# Patient Record
Sex: FEMALE | Race: WHITE | NOT HISPANIC OR LATINO | Employment: OTHER
[De-identification: names, ages, dates, MRNs, and addresses within clinical notes are randomized per-mention and may not be internally consistent; named-entity substitution may affect disease eponyms.]

---

## 2017-01-03 PROBLEM — I70.0 ABDOMINAL AORTIC ATHEROSCLEROSIS (HCC): Status: ACTIVE | Noted: 2017-01-03

## 2017-01-03 PROBLEM — I70.0 ABDOMINAL AORTIC ATHEROSCLEROSIS: Status: ACTIVE | Noted: 2017-01-03

## 2017-01-03 PROBLEM — J47.9 BRONCHIECTASIS WITHOUT COMPLICATION (HCC): Status: ACTIVE | Noted: 2017-01-03

## 2017-01-03 PROBLEM — E03.9 HYPOTHYROIDISM, UNSPECIFIED TYPE: Status: ACTIVE | Noted: 2017-01-03

## 2017-01-03 PROCEDURE — 87088 URINE BACTERIA CULTURE: CPT | Performed by: INTERNAL MEDICINE

## 2017-01-03 PROCEDURE — 87186 SC STD MICRODIL/AGAR DIL: CPT | Performed by: INTERNAL MEDICINE

## 2017-01-03 PROCEDURE — 87086 URINE CULTURE/COLONY COUNT: CPT | Performed by: INTERNAL MEDICINE

## 2017-01-03 PROCEDURE — 81001 URINALYSIS AUTO W/SCOPE: CPT | Performed by: INTERNAL MEDICINE

## 2017-01-25 PROCEDURE — 81003 URINALYSIS AUTO W/O SCOPE: CPT | Performed by: INTERNAL MEDICINE

## 2017-01-25 PROCEDURE — 87086 URINE CULTURE/COLONY COUNT: CPT | Performed by: INTERNAL MEDICINE

## 2017-02-10 PROBLEM — G89.29 CHRONIC LEFT-SIDED LOW BACK PAIN WITH LEFT-SIDED SCIATICA: Status: ACTIVE | Noted: 2017-02-10

## 2017-02-10 PROBLEM — M54.42 CHRONIC LEFT-SIDED LOW BACK PAIN WITH LEFT-SIDED SCIATICA: Status: ACTIVE | Noted: 2017-02-10

## 2017-06-05 PROBLEM — E03.9 HYPOTHYROIDISM, UNSPECIFIED TYPE: Status: RESOLVED | Noted: 2017-01-03 | Resolved: 2017-06-05

## 2017-07-11 ENCOUNTER — HOSPITAL (OUTPATIENT)
Dept: OTHER | Age: 67
End: 2017-07-11
Attending: HOSPITALIST

## 2017-07-11 LAB
ANALYZER ANC (IANC): ABNORMAL
ANION GAP SERPL CALC-SCNC: 13 MMOL/L (ref 10–20)
BASOPHILS # BLD: 0 THOUSAND/MCL (ref 0–0.3)
BASOPHILS NFR BLD: 0 %
BNP SERPL-MCNC: 45 PG/ML
BUN SERPL-MCNC: 11 MG/DL (ref 6–20)
BUN/CREAT SERPL: 13 (ref 7–25)
CALCIUM SERPL-MCNC: 9.1 MG/DL (ref 8.4–10.2)
CHLORIDE: 100 MMOL/L (ref 98–107)
CO2 SERPL-SCNC: 27 MMOL/L (ref 21–32)
CREAT SERPL-MCNC: 0.85 MG/DL (ref 0.51–0.95)
DIFFERENTIAL METHOD BLD: ABNORMAL
EOSINOPHIL # BLD: 0 THOUSAND/MCL (ref 0.1–0.5)
EOSINOPHIL NFR BLD: 0 %
ERYTHROCYTE [DISTWIDTH] IN BLOOD: 13.1 % (ref 11–15)
GLUCOSE SERPL-MCNC: 116 MG/DL (ref 65–99)
HEMATOCRIT: 35.9 % (ref 36–46.5)
HGB BLD-MCNC: 12.1 GM/DL (ref 12–15.5)
LACTATE BLDV-MCNC: 1.4 MMOL/L
LYMPHOCYTES # BLD: 1.5 THOUSAND/MCL (ref 1–4)
LYMPHOCYTES NFR BLD: 12 %
MAGNESIUM SERPL-MCNC: 1.9 MG/DL (ref 1.7–2.4)
MCH RBC QN AUTO: 30.3 PG (ref 26–34)
MCHC RBC AUTO-ENTMCNC: 33.7 GM/DL (ref 32–36.5)
MCV RBC AUTO: 90 FL (ref 78–100)
MONOCYTES # BLD: 1.4 THOUSAND/MCL (ref 0.3–0.9)
MONOCYTES NFR BLD: 12 %
NEUTROPHILS # BLD: 9.3 THOUSAND/MCL (ref 1.8–7.7)
NEUTROPHILS NFR BLD: 76 %
NEUTS SEG NFR BLD: ABNORMAL %
PERCENT NRBC: ABNORMAL
PLATELET # BLD: 272 THOUSAND/MCL (ref 140–450)
POTASSIUM SERPL-SCNC: 4 MMOL/L (ref 3.4–5.1)
PROCALCITONIN SERPL IA-MCNC: 0.13 NG/ML
RBC # BLD: 3.99 MILLION/MCL (ref 4–5.2)
SODIUM SERPL-SCNC: 136 MMOL/L (ref 135–145)
TROPONIN I SERPL HS-MCNC: <0.02 NG/ML
WBC # BLD: 12.2 THOUSAND/MCL (ref 4.2–11)

## 2017-07-12 ENCOUNTER — DIAGNOSTIC TRANS (OUTPATIENT)
Dept: OTHER | Age: 67
End: 2017-07-12

## 2017-07-12 LAB
ANALYZER ANC (IANC): ABNORMAL
ANION GAP SERPL CALC-SCNC: 12 MMOL/L (ref 10–20)
BUN SERPL-MCNC: 17 MG/DL (ref 6–20)
BUN/CREAT SERPL: 24 (ref 7–25)
CALCIUM SERPL-MCNC: 9.2 MG/DL (ref 8.4–10.2)
CHLORIDE: 103 MMOL/L (ref 98–107)
CO2 SERPL-SCNC: 27 MMOL/L (ref 21–32)
CREAT SERPL-MCNC: 0.7 MG/DL (ref 0.51–0.95)
ERYTHROCYTE [DISTWIDTH] IN BLOOD: 12.8 % (ref 11–15)
GLUCOSE SERPL-MCNC: 124 MG/DL (ref 65–99)
HEMATOCRIT: 35 % (ref 36–46.5)
HGB BLD-MCNC: 11.8 GM/DL (ref 12–15.5)
MCH RBC QN AUTO: 29.7 PG (ref 26–34)
MCHC RBC AUTO-ENTMCNC: 33.7 GM/DL (ref 32–36.5)
MCV RBC AUTO: 88.2 FL (ref 78–100)
PLATELET # BLD: 284 THOUSAND/MCL (ref 140–450)
POTASSIUM SERPL-SCNC: 4.5 MMOL/L (ref 3.4–5.1)
RBC # BLD: 3.97 MILLION/MCL (ref 4–5.2)
SODIUM SERPL-SCNC: 137 MMOL/L (ref 135–145)
WBC # BLD: 13.1 THOUSAND/MCL (ref 4.2–11)

## 2017-07-13 LAB
ANALYZER ANC (IANC): ABNORMAL
ERYTHROCYTE [DISTWIDTH] IN BLOOD: 13.1 % (ref 11–15)
HEMATOCRIT: 34 % (ref 36–46.5)
HGB BLD-MCNC: 11.5 GM/DL (ref 12–15.5)
MCH RBC QN AUTO: 30 PG (ref 26–34)
MCHC RBC AUTO-ENTMCNC: 33.8 GM/DL (ref 32–36.5)
MCV RBC AUTO: 88.8 FL (ref 78–100)
PLATELET # BLD: 289 THOUSAND/MCL (ref 140–450)
RBC # BLD: 3.83 MILLION/MCL (ref 4–5.2)
WBC # BLD: 17.7 THOUSAND/MCL (ref 4.2–11)

## 2017-12-11 PROBLEM — G89.29 CHRONIC LEFT-SIDED LOW BACK PAIN WITH LEFT-SIDED SCIATICA: Status: RESOLVED | Noted: 2017-02-10 | Resolved: 2017-12-11

## 2017-12-11 PROBLEM — M54.42 CHRONIC LEFT-SIDED LOW BACK PAIN WITH LEFT-SIDED SCIATICA: Status: RESOLVED | Noted: 2017-02-10 | Resolved: 2017-12-11

## 2018-06-02 PROBLEM — F32.4 MAJOR DEPRESSIVE DISORDER IN PARTIAL REMISSION: Status: ACTIVE | Noted: 2018-06-02

## 2018-06-02 PROBLEM — F32.4 MAJOR DEPRESSIVE DISORDER IN PARTIAL REMISSION (HCC): Status: ACTIVE | Noted: 2018-06-02

## 2018-06-02 PROBLEM — I27.20 PULMONARY HYPERTENSION (HCC): Status: ACTIVE | Noted: 2018-06-02

## 2018-08-03 PROBLEM — M65.332 TRIGGER MIDDLE FINGER OF LEFT HAND: Status: ACTIVE | Noted: 2018-08-03

## 2018-10-02 PROBLEM — M18.0 ARTHRITIS OF CARPOMETACARPAL (CMC) JOINT OF BOTH THUMBS: Status: ACTIVE | Noted: 2018-10-02

## 2018-10-02 PROBLEM — Z48.89 AFTERCARE FOLLOWING SURGERY: Status: ACTIVE | Noted: 2018-10-02

## 2019-06-05 PROBLEM — Z48.89 AFTERCARE FOLLOWING SURGERY: Status: RESOLVED | Noted: 2018-10-02 | Resolved: 2019-06-05

## 2019-06-14 PROBLEM — M00.88: Status: ACTIVE | Noted: 2019-06-14

## 2019-06-14 PROBLEM — M54.81 BILATERAL OCCIPITAL NEURALGIA: Status: ACTIVE | Noted: 2019-06-14

## 2019-06-14 PROBLEM — Z98.1 HISTORY OF FUSION OF CERVICAL SPINE: Status: ACTIVE | Noted: 2019-06-14

## 2019-09-06 PROBLEM — M47.812 ARTHRITIS OF FACET JOINT OF CERVICAL SPINE: Status: ACTIVE | Noted: 2019-06-14

## 2019-10-11 PROBLEM — M00.88: Status: ACTIVE | Noted: 2019-10-11

## 2019-12-05 PROBLEM — I77.1 TORTUOUS AORTA: Status: ACTIVE | Noted: 2019-12-05

## 2019-12-05 PROBLEM — I77.1 TORTUOUS AORTA (HCC): Status: ACTIVE | Noted: 2019-12-05

## 2019-12-17 PROBLEM — M54.12 CERVICAL RADICULITIS: Status: ACTIVE | Noted: 2019-12-17

## 2019-12-17 PROBLEM — Z98.1 S/P CERVICAL SPINAL FUSION: Status: ACTIVE | Noted: 2019-06-14

## 2019-12-17 PROBLEM — M00.88: Status: RESOLVED | Noted: 2019-10-11 | Resolved: 2019-12-17

## 2019-12-17 PROBLEM — M48.02 CERVICAL SPINAL STENOSIS: Status: ACTIVE | Noted: 2019-12-17

## 2020-08-17 ENCOUNTER — LAB ENCOUNTER (OUTPATIENT)
Dept: LAB | Facility: HOSPITAL | Age: 70
End: 2020-08-17
Attending: ORTHOPAEDIC SURGERY
Payer: MEDICARE

## 2020-08-17 DIAGNOSIS — Z01.812 ENCOUNTER FOR PREOPERATIVE SCREENING LABORATORY TESTING FOR COVID-19 VIRUS: Primary | ICD-10-CM

## 2020-08-17 DIAGNOSIS — Z20.822 ENCOUNTER FOR PREOPERATIVE SCREENING LABORATORY TESTING FOR COVID-19 VIRUS: Primary | ICD-10-CM

## 2020-08-18 LAB — SARS-COV-2 RNA RESP QL NAA+PROBE: NOT DETECTED

## 2020-08-20 ENCOUNTER — ANESTHESIA (OUTPATIENT)
Dept: SURGERY | Facility: HOSPITAL | Age: 70
End: 2020-08-20
Payer: MEDICARE

## 2020-08-20 ENCOUNTER — HOSPITAL ENCOUNTER (OUTPATIENT)
Facility: HOSPITAL | Age: 70
Setting detail: HOSPITAL OUTPATIENT SURGERY
Discharge: HOME | End: 2020-08-20
Attending: ORTHOPAEDIC SURGERY | Admitting: ORTHOPAEDIC SURGERY
Payer: MEDICARE

## 2020-08-20 ENCOUNTER — ANESTHESIA EVENT (OUTPATIENT)
Dept: SURGERY | Facility: HOSPITAL | Age: 70
End: 2020-08-20
Payer: MEDICARE

## 2020-08-20 VITALS
OXYGEN SATURATION: 95 % | DIASTOLIC BLOOD PRESSURE: 82 MMHG | HEIGHT: 64 IN | SYSTOLIC BLOOD PRESSURE: 143 MMHG | HEART RATE: 101 BPM | BODY MASS INDEX: 27.89 KG/M2 | WEIGHT: 163.38 LBS | RESPIRATION RATE: 18 BRPM | TEMPERATURE: 98 F

## 2020-08-20 DIAGNOSIS — M18.11 ARTHRITIS OF CARPOMETACARPAL (CMC) JOINT OF RIGHT THUMB: ICD-10-CM

## 2020-08-20 DIAGNOSIS — Z01.818 PRE-OP TESTING: Primary | ICD-10-CM

## 2020-08-20 PROCEDURE — 0RQS0ZZ REPAIR RIGHT CARPOMETACARPAL JOINT, OPEN APPROACH: ICD-10-PCS | Performed by: ORTHOPAEDIC SURGERY

## 2020-08-20 PROCEDURE — 0LU507Z SUPPLEMENT RIGHT LOWER ARM AND WRIST TENDON WITH AUTOLOGOUS TISSUE SUBSTITUTE, OPEN APPROACH: ICD-10-PCS | Performed by: ORTHOPAEDIC SURGERY

## 2020-08-20 PROCEDURE — 76942 ECHO GUIDE FOR BIOPSY: CPT | Performed by: ORTHOPAEDIC SURGERY

## 2020-08-20 PROCEDURE — 99152 MOD SED SAME PHYS/QHP 5/>YRS: CPT | Performed by: ORTHOPAEDIC SURGERY

## 2020-08-20 PROCEDURE — 3E0T3BZ INTRODUCTION OF ANESTHETIC AGENT INTO PERIPHERAL NERVES AND PLEXI, PERCUTANEOUS APPROACH: ICD-10-PCS | Performed by: ANESTHESIOLOGY

## 2020-08-20 PROCEDURE — 64415 NJX AA&/STRD BRCH PLXS IMG: CPT | Performed by: ORTHOPAEDIC SURGERY

## 2020-08-20 RX ORDER — ACETAMINOPHEN 500 MG
1000 TABLET ORAL ONCE
Status: COMPLETED | OUTPATIENT
Start: 2020-08-20 | End: 2020-08-20

## 2020-08-20 RX ORDER — LIDOCAINE HYDROCHLORIDE 10 MG/ML
INJECTION, SOLUTION EPIDURAL; INFILTRATION; INTRACAUDAL; PERINEURAL AS NEEDED
Status: DISCONTINUED | OUTPATIENT
Start: 2020-08-20 | End: 2020-08-20 | Stop reason: SURG

## 2020-08-20 RX ORDER — LORATADINE 10 MG/1
10 TABLET ORAL DAILY
COMMUNITY

## 2020-08-20 RX ORDER — SODIUM CHLORIDE, SODIUM LACTATE, POTASSIUM CHLORIDE, CALCIUM CHLORIDE 600; 310; 30; 20 MG/100ML; MG/100ML; MG/100ML; MG/100ML
INJECTION, SOLUTION INTRAVENOUS CONTINUOUS
Status: DISCONTINUED | OUTPATIENT
Start: 2020-08-20 | End: 2020-08-20

## 2020-08-20 RX ORDER — HALOPERIDOL 5 MG/ML
0.25 INJECTION INTRAMUSCULAR ONCE AS NEEDED
Status: DISCONTINUED | OUTPATIENT
Start: 2020-08-20 | End: 2020-08-20

## 2020-08-20 RX ORDER — HYDROMORPHONE HYDROCHLORIDE 1 MG/ML
0.6 INJECTION, SOLUTION INTRAMUSCULAR; INTRAVENOUS; SUBCUTANEOUS EVERY 5 MIN PRN
Status: DISCONTINUED | OUTPATIENT
Start: 2020-08-20 | End: 2020-08-20

## 2020-08-20 RX ORDER — CEFAZOLIN SODIUM/WATER 2 G/20 ML
2 SYRINGE (ML) INTRAVENOUS ONCE
Status: DISCONTINUED | OUTPATIENT
Start: 2020-08-20 | End: 2020-08-20 | Stop reason: HOSPADM

## 2020-08-20 RX ORDER — MORPHINE SULFATE 10 MG/ML
6 INJECTION, SOLUTION INTRAMUSCULAR; INTRAVENOUS EVERY 10 MIN PRN
Status: DISCONTINUED | OUTPATIENT
Start: 2020-08-20 | End: 2020-08-20

## 2020-08-20 RX ORDER — CEFAZOLIN SODIUM/WATER 2 G/20 ML
SYRINGE (ML) INTRAVENOUS AS NEEDED
Status: DISCONTINUED | OUTPATIENT
Start: 2020-08-20 | End: 2020-08-20 | Stop reason: SURG

## 2020-08-20 RX ORDER — MIDAZOLAM HYDROCHLORIDE 1 MG/ML
INJECTION INTRAMUSCULAR; INTRAVENOUS AS NEEDED
Status: DISCONTINUED | OUTPATIENT
Start: 2020-08-20 | End: 2020-08-20 | Stop reason: SURG

## 2020-08-20 RX ORDER — NALOXONE HYDROCHLORIDE 0.4 MG/ML
80 INJECTION, SOLUTION INTRAMUSCULAR; INTRAVENOUS; SUBCUTANEOUS AS NEEDED
Status: DISCONTINUED | OUTPATIENT
Start: 2020-08-20 | End: 2020-08-20

## 2020-08-20 RX ORDER — ROPIVACAINE HYDROCHLORIDE 5 MG/ML
INJECTION, SOLUTION EPIDURAL; INFILTRATION; PERINEURAL AS NEEDED
Status: DISCONTINUED | OUTPATIENT
Start: 2020-08-20 | End: 2020-08-20 | Stop reason: SURG

## 2020-08-20 RX ORDER — HYDROMORPHONE HYDROCHLORIDE 1 MG/ML
0.4 INJECTION, SOLUTION INTRAMUSCULAR; INTRAVENOUS; SUBCUTANEOUS EVERY 5 MIN PRN
Status: DISCONTINUED | OUTPATIENT
Start: 2020-08-20 | End: 2020-08-20

## 2020-08-20 RX ORDER — ONDANSETRON 2 MG/ML
INJECTION INTRAMUSCULAR; INTRAVENOUS AS NEEDED
Status: DISCONTINUED | OUTPATIENT
Start: 2020-08-20 | End: 2020-08-20 | Stop reason: SURG

## 2020-08-20 RX ORDER — HYDROCODONE BITARTRATE AND ACETAMINOPHEN 5; 325 MG/1; MG/1
1 TABLET ORAL AS NEEDED
Status: DISCONTINUED | OUTPATIENT
Start: 2020-08-20 | End: 2020-08-20

## 2020-08-20 RX ORDER — BUPIVACAINE HYDROCHLORIDE 5 MG/ML
INJECTION, SOLUTION EPIDURAL; INTRACAUDAL AS NEEDED
Status: DISCONTINUED | OUTPATIENT
Start: 2020-08-20 | End: 2020-08-20 | Stop reason: HOSPADM

## 2020-08-20 RX ORDER — MORPHINE SULFATE 4 MG/ML
4 INJECTION, SOLUTION INTRAMUSCULAR; INTRAVENOUS EVERY 10 MIN PRN
Status: DISCONTINUED | OUTPATIENT
Start: 2020-08-20 | End: 2020-08-20

## 2020-08-20 RX ORDER — DEXAMETHASONE SODIUM PHOSPHATE 4 MG/ML
VIAL (ML) INJECTION AS NEEDED
Status: DISCONTINUED | OUTPATIENT
Start: 2020-08-20 | End: 2020-08-20 | Stop reason: SURG

## 2020-08-20 RX ORDER — HYDROMORPHONE HYDROCHLORIDE 1 MG/ML
0.2 INJECTION, SOLUTION INTRAMUSCULAR; INTRAVENOUS; SUBCUTANEOUS EVERY 5 MIN PRN
Status: DISCONTINUED | OUTPATIENT
Start: 2020-08-20 | End: 2020-08-20

## 2020-08-20 RX ORDER — HYDROCODONE BITARTRATE AND ACETAMINOPHEN 5; 325 MG/1; MG/1
2 TABLET ORAL AS NEEDED
Status: DISCONTINUED | OUTPATIENT
Start: 2020-08-20 | End: 2020-08-20

## 2020-08-20 RX ORDER — ONDANSETRON 2 MG/ML
4 INJECTION INTRAMUSCULAR; INTRAVENOUS ONCE AS NEEDED
Status: DISCONTINUED | OUTPATIENT
Start: 2020-08-20 | End: 2020-08-20

## 2020-08-20 RX ORDER — DEXAMETHASONE SODIUM PHOSPHATE 10 MG/ML
INJECTION, SOLUTION INTRAMUSCULAR; INTRAVENOUS AS NEEDED
Status: DISCONTINUED | OUTPATIENT
Start: 2020-08-20 | End: 2020-08-20 | Stop reason: SURG

## 2020-08-20 RX ORDER — FAMOTIDINE 20 MG/1
20 TABLET ORAL ONCE
Status: DISCONTINUED | OUTPATIENT
Start: 2020-08-20 | End: 2020-08-20 | Stop reason: HOSPADM

## 2020-08-20 RX ORDER — MORPHINE SULFATE 4 MG/ML
2 INJECTION, SOLUTION INTRAMUSCULAR; INTRAVENOUS EVERY 10 MIN PRN
Status: DISCONTINUED | OUTPATIENT
Start: 2020-08-20 | End: 2020-08-20

## 2020-08-20 RX ORDER — PROCHLORPERAZINE EDISYLATE 5 MG/ML
5 INJECTION INTRAMUSCULAR; INTRAVENOUS ONCE AS NEEDED
Status: DISCONTINUED | OUTPATIENT
Start: 2020-08-20 | End: 2020-08-20

## 2020-08-20 RX ADMIN — DEXAMETHASONE SODIUM PHOSPHATE 4 MG: 10 INJECTION, SOLUTION INTRAMUSCULAR; INTRAVENOUS at 16:23:00

## 2020-08-20 RX ADMIN — MIDAZOLAM HYDROCHLORIDE 2 MG: 1 INJECTION INTRAMUSCULAR; INTRAVENOUS at 16:21:00

## 2020-08-20 RX ADMIN — DEXAMETHASONE SODIUM PHOSPHATE 4 MG: 4 MG/ML VIAL (ML) INJECTION at 17:38:00

## 2020-08-20 RX ADMIN — CEFAZOLIN SODIUM/WATER 2 G: 2 G/20 ML SYRINGE (ML) INTRAVENOUS at 17:42:00

## 2020-08-20 RX ADMIN — LIDOCAINE HYDROCHLORIDE 2 ML: 10 INJECTION, SOLUTION EPIDURAL; INFILTRATION; INTRACAUDAL; PERINEURAL at 16:21:00

## 2020-08-20 RX ADMIN — ONDANSETRON 4 MG: 2 INJECTION INTRAMUSCULAR; INTRAVENOUS at 17:38:00

## 2020-08-20 RX ADMIN — ROPIVACAINE HYDROCHLORIDE 30 ML: 5 INJECTION, SOLUTION EPIDURAL; INFILTRATION; PERINEURAL at 16:23:00

## 2020-08-20 RX ADMIN — LIDOCAINE HYDROCHLORIDE 50 MG: 10 INJECTION, SOLUTION EPIDURAL; INFILTRATION; INTRACAUDAL; PERINEURAL at 17:38:00

## 2020-08-20 RX ADMIN — SODIUM CHLORIDE, SODIUM LACTATE, POTASSIUM CHLORIDE, CALCIUM CHLORIDE: 600; 310; 30; 20 INJECTION, SOLUTION INTRAVENOUS at 19:06:00

## 2020-08-20 RX ADMIN — SODIUM CHLORIDE, SODIUM LACTATE, POTASSIUM CHLORIDE, CALCIUM CHLORIDE: 600; 310; 30; 20 INJECTION, SOLUTION INTRAVENOUS at 17:33:00

## 2020-08-20 NOTE — ANESTHESIA PROCEDURE NOTES
Peripheral Block  Performed by: Taylor Johnson MD  Authorized by: Taylor Johnson MD       General Information and Staff    Start Time:  8/20/2020 4:21 PM  End Time:  8/20/2020 4:24 PM  Anesthesiologist:  Taylor Johnson MD  Performed by:

## 2020-08-20 NOTE — ANESTHESIA PREPROCEDURE EVALUATION
Anesthesia PreOp Note    HPI:     Shawn Brooks is a 68year old female who presents for preoperative consultation requested by: Marisabel Cabrales MD    Date of Surgery: 8/20/2020    Procedure(s):  HAND RESECTION ARTHROPLASTY OF CARPAL / Madelin Jennings Date Noted: 03/07/2016      Fibromyalgia         Date Noted: 06/01/2015      Cervicalgia of tlmqkloy-nzogauh-eljts region         Date Noted: 10/26/2012      Osteoarthritis, knee         Date Noted: 02/02/2012      Renal cyst         Date Noted: 05/19/2011 Left 05/15/2020   • CERVICAL EPIDURAL N/A 3/2/2020    Performed by Aziza Pearson MD at [address] [facility] N/A 1/29/2020    Performed by Aziza Pearson MD at [address] [facility] N/A 12/20 HISTORY  6/18/15     RBUS Dr. Kathy Reed   • OTHER SURGICAL HISTORY  6/30/16    rbus dr Sona Hill    • Bre Pipe Left 9/17/2018    Performed by Inez Lerner MD at [address] [place] [address]   • RIGHT PHACOEMULSIFICATION OF CATARACT WITH INTRAOCULAR L = 2 tab, Oral, Q6H, PRN as needed for pain, Tab, Maintenance, Disp: , Rfl: , 8/20/2020 at 0745  VITAMIN D 1000 UNIT OR TABS, 2 TABLET DAILY, Disp: , Rfl: , 8/13/2020 at Unknown time  traMADol HCl 50 MG Oral Tab, Take 1 tablet (50 mg total) by mouth every 6 needs:         Medical: Not on file        Non-medical: Not on file    Tobacco Use      Smoking status: Never Smoker      Smokeless tobacco: Never Used    Substance and Sexual Activity      Alcohol use: Yes        Comment: rarely      Drug use: No      Sexu CREATSERUM 0.96 (H) 08/03/2020    GLU 95 08/03/2020    CA 9.6 08/03/2020     Lab Results   Component Value Date    INR 0.9 08/03/2020       Vital Signs: Body mass index is 28.05 kg/m². height is 1.626 m (5' 4\") and weight is 74.1 kg (163 lb 6.4 oz). arrest, infection, bleeding, and  nerve damage. The patient desires the proposed regional anesthetic as planned.   ALIYAH PATEL  8/20/2020 4:27 PM

## 2020-08-20 NOTE — ANESTHESIA PROCEDURE NOTES
Airway  Date/Time: 8/20/2020 5:40 PM  Urgency: Elective    Airway not difficult    General Information and Staff    Patient location during procedure: OR  Anesthesiologist: Rick Mckeon MD  Performed: anesthesiologist     Indications and Patient Co

## 2020-08-20 NOTE — H&P
History and Physical Examination     Date: August 20, 2020     Chief complaint: Right thumb pain             Past Medical History:   Diagnosis Date   • Adenomatous polyp of colon 6/20/11     EGD/colonoscopy at [facility] - H pylori negative gastritis; small adeno antibiotic surgical site infection prophylaxis.  Procedure: HIP INJECTION; Surgeon: Geri Ornelas MD; Location: [address] [place] [address]   10/9/2013 Patient documented not to have experienced any of the following events Procedure: HIP INJECTION; Surgeon drinker.     Review of Systems: The patient complains of right thumb pain today. She denies any chest pain, shortness of breath, or fever/chills.   Her review of systems is otherwise negative.     Physical Exam  Vitals: Temperature 97.5 °F, pulse 82, respi offered her a surgical procedure involving a right thumb CMC joint arthroplasty with ligament reconstruction and tendon interposition using a flexor carpi radialis autograft. The risks and benefits of the surgical procedure were explained to the patient.  Kellie Topete

## 2020-08-21 NOTE — BRIEF OP NOTE
Pre-Operative Diagnosis: Arthritis of carpometacarpal [place]) joint of right thumb [M18.11]     Post-Operative Diagnosis: Arthritis of carpometacarpal [place]) joint of right thumb [M18.11]      Procedure Performed:   Procedure(s):  RIGHT THUMB CARPOMETACARPAL

## 2020-08-21 NOTE — ANESTHESIA POSTPROCEDURE EVALUATION
Patient: Melida Ortiz    Procedure Summary     Date:  08/20/20 Room / Location:  [address] [place] [address] MAIN OR 08 / [address] [place] [address] OR    Anesthesia Start:  9692 Anesthesia Stop:  1914    Procedure:  HAND RESECTION ARTHROPLASTY OF CARPAL / METACARPAL (Right Finger) Diagnosis:

## 2020-08-21 NOTE — OPERATIVE REPORT
Patient Name: Lashell Mobley     Procedure Date: 2020     : 1950     Preoperative Diagnosis: Arthritis of carpometacarpal [place]) joint of right thumb        Postoperative Diagnosis: Arthritis of carpometacarpal (CMC) joint of right thumb the dorsal aspect of the right thumb CMC joint. Thick skin flaps were created. The superficial sensory branches of the radial nerve were identified and carefully retracted.   We identified the extensor tendons of the first dorsal extensor compartment and in an accordion-like fashion and secured using 4-0 Prolene suture. The thumb CMC joint capsule was closed using 3-0 PDS suture. The EPB tendon was transferred to the APL tendon and secured using 4-0 nylon suture.   Great care was taken to protect the neur

## 2020-08-24 PROBLEM — M25.50 POLYARTHRALGIA: Status: ACTIVE | Noted: 2020-08-24

## 2020-09-22 PROBLEM — M47.812 ARTHROPATHY OF CERVICAL FACET JOINT: Status: ACTIVE | Noted: 2019-06-14

## 2020-11-12 PROBLEM — M08.80 JIA (JUVENILE IDIOPATHIC ARTHRITIS) (HCC): Status: ACTIVE | Noted: 2020-11-12

## 2020-12-07 PROBLEM — I73.00 RAYNAUD'S PHENOMENON WITHOUT GANGRENE: Status: ACTIVE | Noted: 2020-12-07

## 2020-12-16 ENCOUNTER — LAB ENCOUNTER (OUTPATIENT)
Dept: LAB | Age: 70
End: 2020-12-16
Attending: ORTHOPAEDIC SURGERY
Payer: MEDICARE

## 2020-12-16 DIAGNOSIS — Z01.818 PRE-OP TESTING: ICD-10-CM

## 2020-12-16 LAB — SARS-COV-2 RNA RESP QL NAA+PROBE: NOT DETECTED

## 2020-12-16 RX ORDER — ASPIRIN 81 MG/1
81 TABLET ORAL NIGHTLY
COMMUNITY

## 2020-12-17 ENCOUNTER — HOSPITAL ENCOUNTER (OUTPATIENT)
Facility: HOSPITAL | Age: 70
Setting detail: HOSPITAL OUTPATIENT SURGERY
Discharge: HOME | End: 2020-12-17
Attending: ORTHOPAEDIC SURGERY | Admitting: ORTHOPAEDIC SURGERY
Payer: MEDICARE

## 2020-12-17 ENCOUNTER — ANESTHESIA (OUTPATIENT)
Dept: SURGERY | Facility: HOSPITAL | Age: 70
End: 2020-12-17
Payer: MEDICARE

## 2020-12-17 ENCOUNTER — ANESTHESIA EVENT (OUTPATIENT)
Dept: SURGERY | Facility: HOSPITAL | Age: 70
End: 2020-12-17
Payer: MEDICARE

## 2020-12-17 VITALS
RESPIRATION RATE: 18 BRPM | HEIGHT: 64 IN | OXYGEN SATURATION: 100 % | SYSTOLIC BLOOD PRESSURE: 113 MMHG | BODY MASS INDEX: 27.49 KG/M2 | HEART RATE: 80 BPM | WEIGHT: 161 LBS | DIASTOLIC BLOOD PRESSURE: 78 MMHG | TEMPERATURE: 98 F

## 2020-12-17 DIAGNOSIS — M18.12 ARTHRITIS OF CARPOMETACARPAL (CMC) JOINT OF LEFT THUMB: ICD-10-CM

## 2020-12-17 DIAGNOSIS — Z01.818 PRE-OP TESTING: Primary | ICD-10-CM

## 2020-12-17 PROCEDURE — 76942 ECHO GUIDE FOR BIOPSY: CPT | Performed by: ORTHOPAEDIC SURGERY

## 2020-12-17 PROCEDURE — 99152 MOD SED SAME PHYS/QHP 5/>YRS: CPT | Performed by: ORTHOPAEDIC SURGERY

## 2020-12-17 PROCEDURE — 3E0T3BZ INTRODUCTION OF ANESTHETIC AGENT INTO PERIPHERAL NERVES AND PLEXI, PERCUTANEOUS APPROACH: ICD-10-PCS | Performed by: ANESTHESIOLOGY

## 2020-12-17 PROCEDURE — 0LB60ZZ EXCISION OF LEFT LOWER ARM AND WRIST TENDON, OPEN APPROACH: ICD-10-PCS | Performed by: ORTHOPAEDIC SURGERY

## 2020-12-17 PROCEDURE — 64417 NJX AA&/STRD AX NERVE IMG: CPT | Performed by: ORTHOPAEDIC SURGERY

## 2020-12-17 PROCEDURE — 0RQT0ZZ REPAIR LEFT CARPOMETACARPAL JOINT, OPEN APPROACH: ICD-10-PCS | Performed by: ORTHOPAEDIC SURGERY

## 2020-12-17 PROCEDURE — 0LU607Z SUPPLEMENT LEFT LOWER ARM AND WRIST TENDON WITH AUTOLOGOUS TISSUE SUBSTITUTE, OPEN APPROACH: ICD-10-PCS | Performed by: ORTHOPAEDIC SURGERY

## 2020-12-17 RX ORDER — ACETAMINOPHEN 500 MG
1000 TABLET ORAL ONCE
Status: DISCONTINUED | OUTPATIENT
Start: 2020-12-17 | End: 2020-12-17 | Stop reason: HOSPADM

## 2020-12-17 RX ORDER — BUPIVACAINE HYDROCHLORIDE 5 MG/ML
INJECTION, SOLUTION EPIDURAL; INTRACAUDAL AS NEEDED
Status: DISCONTINUED | OUTPATIENT
Start: 2020-12-17 | End: 2020-12-17 | Stop reason: HOSPADM

## 2020-12-17 RX ORDER — SODIUM CHLORIDE, SODIUM LACTATE, POTASSIUM CHLORIDE, CALCIUM CHLORIDE 600; 310; 30; 20 MG/100ML; MG/100ML; MG/100ML; MG/100ML
INJECTION, SOLUTION INTRAVENOUS CONTINUOUS
Status: DISCONTINUED | OUTPATIENT
Start: 2020-12-17 | End: 2020-12-17

## 2020-12-17 RX ORDER — ROPIVACAINE HYDROCHLORIDE 5 MG/ML
INJECTION, SOLUTION EPIDURAL; INFILTRATION; PERINEURAL
Status: COMPLETED | OUTPATIENT
Start: 2020-12-17 | End: 2020-12-17

## 2020-12-17 RX ORDER — CEFAZOLIN SODIUM/WATER 2 G/20 ML
SYRINGE (ML) INTRAVENOUS AS NEEDED
Status: DISCONTINUED | OUTPATIENT
Start: 2020-12-17 | End: 2020-12-17 | Stop reason: SURG

## 2020-12-17 RX ORDER — DEXAMETHASONE SODIUM PHOSPHATE 10 MG/ML
INJECTION, SOLUTION INTRAMUSCULAR; INTRAVENOUS
Status: COMPLETED | OUTPATIENT
Start: 2020-12-17 | End: 2020-12-17

## 2020-12-17 RX ORDER — MIDAZOLAM HYDROCHLORIDE 1 MG/ML
INJECTION INTRAMUSCULAR; INTRAVENOUS
Status: COMPLETED | OUTPATIENT
Start: 2020-12-17 | End: 2020-12-17

## 2020-12-17 RX ORDER — CEFAZOLIN SODIUM/WATER 2 G/20 ML
2 SYRINGE (ML) INTRAVENOUS ONCE
Status: DISCONTINUED | OUTPATIENT
Start: 2020-12-17 | End: 2020-12-17 | Stop reason: HOSPADM

## 2020-12-17 RX ADMIN — SODIUM CHLORIDE, SODIUM LACTATE, POTASSIUM CHLORIDE, CALCIUM CHLORIDE: 600; 310; 30; 20 INJECTION, SOLUTION INTRAVENOUS at 17:08:00

## 2020-12-17 RX ADMIN — ROPIVACAINE HYDROCHLORIDE 30 ML: 5 INJECTION, SOLUTION EPIDURAL; INFILTRATION; PERINEURAL at 15:40:00

## 2020-12-17 RX ADMIN — DEXAMETHASONE SODIUM PHOSPHATE 10 MG: 10 INJECTION, SOLUTION INTRAMUSCULAR; INTRAVENOUS at 15:40:00

## 2020-12-17 RX ADMIN — SODIUM CHLORIDE, SODIUM LACTATE, POTASSIUM CHLORIDE, CALCIUM CHLORIDE: 600; 310; 30; 20 INJECTION, SOLUTION INTRAVENOUS at 16:00:00

## 2020-12-17 RX ADMIN — MIDAZOLAM HYDROCHLORIDE 2 MG: 1 INJECTION INTRAMUSCULAR; INTRAVENOUS at 15:40:00

## 2020-12-17 RX ADMIN — CEFAZOLIN SODIUM/WATER 2 G: 2 G/20 ML SYRINGE (ML) INTRAVENOUS at 16:19:00

## 2020-12-17 NOTE — ANESTHESIA PROCEDURE NOTES
Peripheral Block    Date/Time: 12/17/2020 3:40 PM  Performed by: Francis Boyd MD  Authorized by: Leighann Alejandra MD       General Information and Staff    Start Time:  12/17/2020 3:40 PM  End Time:  12/17/2020 3:50 PM  Anesthesiologist:  Natalie Iglesias mL  dexamethasone (DECADRON) PF injection 10 mg/ml, 10 mg    Additional Comments

## 2020-12-17 NOTE — ANESTHESIA PREPROCEDURE EVALUATION
Anesthesia PreOp Note    HPI:     Marilynn Rey is a 68year old female who presents for preoperative consultation requested by: Bridget Anderson MD    Date of Surgery: 12/17/2020    Procedure(s):  HAND RESECTION ARTHROPLASTY OF CARPAL / [place] Spare of both hips         Date Noted: 10/14/2016      S/P lumbar laminectomy         Date Noted: 10/14/2016      Colon polyp         Date Noted: 06/16/2016      Essential hypertension, benign         Date Noted: 03/07/2016      Fibromyalgia         Date Noted: BACK SURGERY  12/9/13    L5-S1 lami/disk by Dr Rosa Sommer   • [address]  12/29/16    L4-S1 re-expl lami disk   • CATARACT      bilat may/june 2020   • CERVICAL EPIDURAL N/A 3/2/2020    Performed by Leonidas Mccollum MD at [address]   • 12/29/2016    Performed by Ivis Gallagher MD at [facility] MAIN OR   • OTHER SURGICAL HISTORY  11-17-11    [facility] Dr. Michelle Uribe   • OTHER SURGICAL HISTORY  9-20-12    RBUS -Dr. Michelle Uribe   • OTHER SURGICAL HISTORY  09/26/2013    RBUS- Dr. Michelle Uribe   • OTHER SURGICAL HI HCl 60 MG Oral Cap DR Particles, Take 1 capsule (60 mg total) by mouth 2 (two) times daily. , Disp: 180 capsule, Rfl: 3, 12/17/2020 at 0700    •  atorvastatin 20 MG Oral Tab, Take 1 tablet (20 mg total) by mouth daily.  (Patient taking differently: Take 20 m Socioeconomic History      Marital status:       Spouse name: Not on file      Number of children: Not on file      Years of education: Not on file      Highest education level: Not on file    Occupational History      Occupation: homemaker    Carol Brennan reviewed.   Lab Results   Component Value Date    WBC 5.07 11/12/2020    RBC 4.42 11/12/2020    HGB 13.3 11/12/2020    HCT 41.6 11/12/2020    MCV 94.1 11/12/2020    MCH 30.1 11/12/2020    MCHC 32.0 11/12/2020    RDW 13.8 11/12/2020     11/12/2020 dental damage if relevant, major complications, and any alternative forms of anesthetic management. All of the patient's questions were answered to the best of my ability. The patient desires the anesthetic management as planned.   Tawanda Rutherford  12/17/20

## 2020-12-17 NOTE — ANESTHESIA POSTPROCEDURE EVALUATION
Patient: Kaur Maravilla    Procedure Summary     Date: 12/17/20 Room / Location: [address] [place] [address] MAIN OR 11 / [address] [place] [address] MAIN OR    Anesthesia Start: 1600 Anesthesia Stop: 3228    Procedure: HAND RESECTION ARTHROPLASTY OF CARPAL / METACARPAL (Left Hand) Diagnosis:       A

## 2020-12-17 NOTE — BRIEF OP NOTE
Pre-Operative Diagnosis: Arthritis of carpometacarpal [place]) joint of left thumb [M18.12]     Post-Operative Diagnosis: Arthritis of carpometacarpal (CMC) joint of left thumb [M18.12]      Procedure Performed:   Procedure(s):  LEFT THUMB CARPOMETACARPAL DRISS

## 2020-12-17 NOTE — H&P
History and Physical Examination.     Date: December 17, 2020     Chief complaint: Left thumb pain             Past Medical History:   Diagnosis Date   • Adenomatous polyp of colon 6/20/11     EGD/colonoscopy at [facility] - H pylori negative gastritis; small erika antibiotic surgical site infection prophylaxis.  Procedure: HIP INJECTION; Surgeon: Senia Lee MD; Location: [address] [place] [address]   10/9/2013 Patient documented not to have experienced any of the following events Procedure: HIP INJECTION; Surgeon fever/chills. Her review of systems is otherwise negative.     Physical Exam  Vitals: Temperature 97.5 °F, pulse 96, respiratory rate 20, blood pressure 162/80     Cardiovascular:  Regular rate and rhythm.     Lungs:  Clear to auscultation bilaterally.     arthroplasty with ligament reconstruction and tendon interposition using a flexor carpi radialis autograft. The risks and benefits of the surgical procedure were explained to the patient.  The risks that were discussed include, but are not limited to, wound

## 2020-12-18 NOTE — OPERATIVE REPORT
Patient Name: Mike Neil     Procedure Date: 2020     : 1950     Preoperative Diagnosis: Arthritis of carpometacarpal [place]) joint of left thumb [M18.12]         Postoperative Diagnosis: Arthritis of carpometacarpal (CMC) joint of left dorsal aspect of the  left thumb CMC joint.  Thick skin flaps were created.  The superficial sensory branches of the radial nerve were identified and carefully retracted.  We identified the extensor tendons of the first dorsal extensor compartment and inci accordion-like fashion and secured using 4-0 Prolene suture.  The thumb CMC joint capsule was closed using 3-0 PDS suture.  The EPB tendon was transferred to the APL tendon and secured using 4-0 nylon suture.  Great care was taken to protect the neurovascu

## 2021-06-08 PROBLEM — D84.9 IMMUNOCOMPROMISED STATE (HCC): Status: ACTIVE | Noted: 2021-06-08

## 2021-06-08 PROBLEM — I70.0 AORTIC ATHEROSCLEROSIS: Status: ACTIVE | Noted: 2021-06-08

## 2021-06-08 PROBLEM — I70.0 AORTIC ATHEROSCLEROSIS (HCC): Status: ACTIVE | Noted: 2021-06-08

## 2021-12-16 ENCOUNTER — HOSPITAL ENCOUNTER (EMERGENCY)
Facility: HOSPITAL | Age: 71
Discharge: HOME | End: 2021-12-16
Attending: EMERGENCY MEDICINE
Payer: MEDICARE

## 2021-12-16 VITALS
BODY MASS INDEX: 26.98 KG/M2 | OXYGEN SATURATION: 98 % | HEIGHT: 64 IN | RESPIRATION RATE: 18 BRPM | TEMPERATURE: 98 F | SYSTOLIC BLOOD PRESSURE: 162 MMHG | DIASTOLIC BLOOD PRESSURE: 70 MMHG | HEART RATE: 82 BPM | WEIGHT: 158 LBS

## 2021-12-16 DIAGNOSIS — I10 HYPERTENSION, UNSPECIFIED TYPE: ICD-10-CM

## 2021-12-16 DIAGNOSIS — G43.001 MIGRAINE WITHOUT AURA AND WITH STATUS MIGRAINOSUS, NOT INTRACTABLE: Primary | ICD-10-CM

## 2021-12-16 DIAGNOSIS — R07.89 CHEST PAIN, ATYPICAL: ICD-10-CM

## 2021-12-16 PROCEDURE — 84484 ASSAY OF TROPONIN QUANT: CPT | Performed by: EMERGENCY MEDICINE

## 2021-12-16 PROCEDURE — 85025 COMPLETE CBC W/AUTO DIFF WBC: CPT | Performed by: EMERGENCY MEDICINE

## 2021-12-16 PROCEDURE — 81003 URINALYSIS AUTO W/O SCOPE: CPT | Performed by: EMERGENCY MEDICINE

## 2021-12-16 PROCEDURE — 96375 TX/PRO/DX INJ NEW DRUG ADDON: CPT

## 2021-12-16 PROCEDURE — 80048 BASIC METABOLIC PNL TOTAL CA: CPT

## 2021-12-16 PROCEDURE — 96361 HYDRATE IV INFUSION ADD-ON: CPT

## 2021-12-16 PROCEDURE — 84484 ASSAY OF TROPONIN QUANT: CPT

## 2021-12-16 PROCEDURE — 99284 EMERGENCY DEPT VISIT MOD MDM: CPT

## 2021-12-16 PROCEDURE — 96374 THER/PROPH/DIAG INJ IV PUSH: CPT

## 2021-12-16 PROCEDURE — 85025 COMPLETE CBC W/AUTO DIFF WBC: CPT

## 2021-12-16 PROCEDURE — 93005 ELECTROCARDIOGRAM TRACING: CPT

## 2021-12-16 PROCEDURE — 93010 ELECTROCARDIOGRAM REPORT: CPT | Performed by: INTERNAL MEDICINE

## 2021-12-16 PROCEDURE — 80048 BASIC METABOLIC PNL TOTAL CA: CPT | Performed by: EMERGENCY MEDICINE

## 2021-12-16 RX ORDER — METOCLOPRAMIDE HYDROCHLORIDE 5 MG/ML
10 INJECTION INTRAMUSCULAR; INTRAVENOUS ONCE
Status: COMPLETED | OUTPATIENT
Start: 2021-12-16 | End: 2021-12-16

## 2021-12-16 RX ORDER — KETOROLAC TROMETHAMINE 15 MG/ML
15 INJECTION, SOLUTION INTRAMUSCULAR; INTRAVENOUS ONCE
Status: COMPLETED | OUTPATIENT
Start: 2021-12-16 | End: 2021-12-16

## 2021-12-16 NOTE — ED INITIAL ASSESSMENT (HPI)
Patient states she was sent by PMD for elevated blood pressure. Patient states she's been having headaches and intermittent chest pains.

## 2021-12-17 NOTE — ED PROVIDER NOTES
Patient Seen in: [facility] AND [facility] Emergency Department      History   Patient presents with:  Hypertension    Stated Complaint: htn w/ CP    Subjective:   HPI    68-year-old female with past medical history significant for asthma, anxiety, depression, h prolapse-watching              Past Surgical History:   Procedure Laterality Date   • ADENOIDECTOMY     • BACK SURGERY  3/12/12    C5-7 ACDF   • BACK SURGERY  12/9/13    L5-S1 lami/disk by Dr Yari Ordoñez   • [address]  12/29/16    L4-S1 re-expl lami disk noted in HPI. Constitutional and vital signs reviewed. All other systems reviewed and negative except as noted above.     Physical Exam     ED Triage Vitals [12/16/21 1736]   BP (!) 191/96   Pulse 81   Resp 18   Temp 97.7 °F (36.5 °C)   Temp src Tempo for panel order CBC With Differential With Platelet.   Procedure                               Abnormality         Status                     ---------                               -----------         ------                     CBC W/ DIFFERENTIAL[[id number]

## 2022-06-26 ENCOUNTER — HOSPITAL ENCOUNTER (INPATIENT)
Age: 72
LOS: 3 days | Discharge: HOME | DRG: 177 | End: 2022-06-30
Attending: EMERGENCY MEDICINE | Admitting: HOSPITALIST

## 2022-06-26 DIAGNOSIS — U07.1 PNEUMONIA DUE TO COVID-19 VIRUS: ICD-10-CM

## 2022-06-26 DIAGNOSIS — J12.82 PNEUMONIA DUE TO COVID-19 VIRUS: ICD-10-CM

## 2022-06-26 DIAGNOSIS — J18.1 LUNG CONSOLIDATION (CMD): Primary | ICD-10-CM

## 2022-06-26 DIAGNOSIS — R09.02 HYPOXIA: ICD-10-CM

## 2022-06-26 LAB
ALBUMIN SERPL-MCNC: 3.2 G/DL (ref 3.6–5.1)
ALBUMIN SERPL-MCNC: 3.2 G/DL (ref 3.6–5.1)
ALBUMIN/GLOB SERPL: 0.8 {RATIO} (ref 1–2.4)
ALBUMIN/GLOB SERPL: 0.9 {RATIO} (ref 1–2.4)
ALP SERPL-CCNC: 124 UNITS/L (ref 45–117)
ALP SERPL-CCNC: 129 UNITS/L (ref 45–117)
ALT SERPL-CCNC: 21 UNITS/L
ALT SERPL-CCNC: 23 UNITS/L
ANION GAP SERPL CALC-SCNC: 10 MMOL/L (ref 7–19)
ANION GAP SERPL CALC-SCNC: 10 MMOL/L (ref 7–19)
APTT PPP: 23 SEC (ref 22–30)
AST SERPL-CCNC: 16 UNITS/L
AST SERPL-CCNC: 17 UNITS/L
BASOPHILS # BLD: 0 K/MCL (ref 0–0.3)
BASOPHILS NFR BLD: 1 %
BILIRUB SERPL-MCNC: 0.4 MG/DL (ref 0.2–1)
BILIRUB SERPL-MCNC: 0.4 MG/DL (ref 0.2–1)
BUN SERPL-MCNC: 17 MG/DL (ref 6–20)
BUN SERPL-MCNC: 17 MG/DL (ref 6–20)
BUN/CREAT SERPL: 20 (ref 7–25)
BUN/CREAT SERPL: 20 (ref 7–25)
CALCIUM SERPL-MCNC: 9.1 MG/DL (ref 8.4–10.2)
CALCIUM SERPL-MCNC: 9.2 MG/DL (ref 8.4–10.2)
CHLORIDE SERPL-SCNC: 105 MMOL/L (ref 97–110)
CHLORIDE SERPL-SCNC: 106 MMOL/L (ref 97–110)
CO2 SERPL-SCNC: 27 MMOL/L (ref 21–32)
CO2 SERPL-SCNC: 28 MMOL/L (ref 21–32)
CREAT SERPL-MCNC: 0.84 MG/DL (ref 0.51–0.95)
CREAT SERPL-MCNC: 0.87 MG/DL (ref 0.51–0.95)
CRP SERPL-MCNC: 5.1 MG/DL
D DIMER PPP FEU-MCNC: 0.36 MG/L (FEU)
DEPRECATED RDW RBC: 48.8 FL (ref 39–50)
EOSINOPHIL # BLD: 0.1 K/MCL (ref 0–0.5)
EOSINOPHIL NFR BLD: 1 %
ERYTHROCYTE [DISTWIDTH] IN BLOOD: 13.6 % (ref 11–15)
ERYTHROCYTE [SEDIMENTATION RATE] IN BLOOD BY WESTERGREN METHOD: 37 MM/HR (ref 0–20)
FASTING DURATION TIME PATIENT: ABNORMAL H
FASTING DURATION TIME PATIENT: ABNORMAL H
GFR SERPLBLD BASED ON 1.73 SQ M-ARVRAT: 70 ML/MIN
GFR SERPLBLD BASED ON 1.73 SQ M-ARVRAT: 73 ML/MIN
GLOBULIN SER-MCNC: 3.4 G/DL (ref 2–4)
GLOBULIN SER-MCNC: 3.9 G/DL (ref 2–4)
GLUCOSE SERPL-MCNC: 68 MG/DL (ref 70–99)
GLUCOSE SERPL-MCNC: 71 MG/DL (ref 70–99)
HCT VFR BLD CALC: 38.9 % (ref 36–46.5)
HGB BLD-MCNC: 12.5 G/DL (ref 12–15.5)
IMM GRANULOCYTES # BLD AUTO: 0.1 K/MCL (ref 0–0.2)
IMM GRANULOCYTES # BLD: 1 %
INR PPP: 1
LACTATE BLDV-SCNC: 2 MMOL/L (ref 0–2)
LYMPHOCYTES # BLD: 0.4 K/MCL (ref 1–4)
LYMPHOCYTES NFR BLD: 7 %
MCH RBC QN AUTO: 32 PG (ref 26–34)
MCHC RBC AUTO-ENTMCNC: 32.1 G/DL (ref 32–36.5)
MCV RBC AUTO: 99.5 FL (ref 78–100)
MONOCYTES # BLD: 0.7 K/MCL (ref 0.3–0.9)
MONOCYTES NFR BLD: 11 %
NEUTROPHILS # BLD: 4.7 K/MCL (ref 1.8–7.7)
NEUTROPHILS NFR BLD: 79 %
NRBC BLD MANUAL-RTO: 0 /100 WBC
NT-PROBNP SERPL-MCNC: 213 PG/ML
PLATELET # BLD AUTO: 262 K/MCL (ref 140–450)
POTASSIUM SERPL-SCNC: 3.4 MMOL/L (ref 3.4–5.1)
POTASSIUM SERPL-SCNC: 3.7 MMOL/L (ref 3.4–5.1)
PROCALCITONIN SERPL IA-MCNC: <0.05 NG/ML
PROT SERPL-MCNC: 6.6 G/DL (ref 6.4–8.2)
PROT SERPL-MCNC: 7.1 G/DL (ref 6.4–8.2)
PROTHROMBIN TIME: 10.4 SEC (ref 9.7–11.8)
RAINBOW EXTRA TUBES HOLD SPECIMEN: NORMAL
RBC # BLD: 3.91 MIL/MCL (ref 4–5.2)
SODIUM SERPL-SCNC: 139 MMOL/L (ref 135–145)
SODIUM SERPL-SCNC: 140 MMOL/L (ref 135–145)
TROPONIN I SERPL DL<=0.01 NG/ML-MCNC: 6 NG/L
WBC # BLD: 6 K/MCL (ref 4.2–11)

## 2022-06-26 PROCEDURE — 85730 THROMBOPLASTIN TIME PARTIAL: CPT | Performed by: EMERGENCY MEDICINE

## 2022-06-26 PROCEDURE — 85025 COMPLETE CBC W/AUTO DIFF WBC: CPT | Performed by: EMERGENCY MEDICINE

## 2022-06-26 PROCEDURE — 99285 EMERGENCY DEPT VISIT HI MDM: CPT

## 2022-06-26 PROCEDURE — 96361 HYDRATE IV INFUSION ADD-ON: CPT

## 2022-06-26 PROCEDURE — G0378 HOSPITAL OBSERVATION PER HR: HCPCS

## 2022-06-26 PROCEDURE — 96372 THER/PROPH/DIAG INJ SC/IM: CPT

## 2022-06-26 PROCEDURE — 10002800 HB RX 250 W HCPCS: Performed by: EMERGENCY MEDICINE

## 2022-06-26 PROCEDURE — 93010 ELECTROCARDIOGRAM REPORT: CPT | Performed by: INTERNAL MEDICINE

## 2022-06-26 PROCEDURE — 87040 BLOOD CULTURE FOR BACTERIA: CPT | Performed by: EMERGENCY MEDICINE

## 2022-06-26 PROCEDURE — 85652 RBC SED RATE AUTOMATED: CPT | Performed by: EMERGENCY MEDICINE

## 2022-06-26 PROCEDURE — 96375 TX/PRO/DX INJ NEW DRUG ADDON: CPT

## 2022-06-26 PROCEDURE — 93005 ELECTROCARDIOGRAM TRACING: CPT | Performed by: EMERGENCY MEDICINE

## 2022-06-26 PROCEDURE — 85379 FIBRIN DEGRADATION QUANT: CPT | Performed by: EMERGENCY MEDICINE

## 2022-06-26 PROCEDURE — 96365 THER/PROPH/DIAG IV INF INIT: CPT

## 2022-06-26 PROCEDURE — 10004651 HB RX, NO CHARGE ITEM: Performed by: HOSPITALIST

## 2022-06-26 PROCEDURE — 96374 THER/PROPH/DIAG INJ IV PUSH: CPT

## 2022-06-26 PROCEDURE — 10002801 HB RX 250 W/O HCPCS: Performed by: HOSPITALIST

## 2022-06-26 PROCEDURE — 10002807 HB RX 258: Performed by: EMERGENCY MEDICINE

## 2022-06-26 PROCEDURE — 36415 COLL VENOUS BLD VENIPUNCTURE: CPT

## 2022-06-26 PROCEDURE — 10002803 HB RX 637: Performed by: HOSPITALIST

## 2022-06-26 PROCEDURE — 10002803 HB RX 637: Performed by: INTERNAL MEDICINE

## 2022-06-26 PROCEDURE — 86140 C-REACTIVE PROTEIN: CPT | Performed by: EMERGENCY MEDICINE

## 2022-06-26 PROCEDURE — 10004651 HB RX, NO CHARGE ITEM: Performed by: EMERGENCY MEDICINE

## 2022-06-26 PROCEDURE — 85610 PROTHROMBIN TIME: CPT | Performed by: EMERGENCY MEDICINE

## 2022-06-26 PROCEDURE — 10002800 HB RX 250 W HCPCS: Performed by: HOSPITALIST

## 2022-06-26 PROCEDURE — XW033E5 INTRODUCTION OF REMDESIVIR ANTI-INFECTIVE INTO PERIPHERAL VEIN, PERCUTANEOUS APPROACH, NEW TECHNOLOGY GROUP 5: ICD-10-PCS | Performed by: EMERGENCY MEDICINE

## 2022-06-26 PROCEDURE — 83880 ASSAY OF NATRIURETIC PEPTIDE: CPT | Performed by: EMERGENCY MEDICINE

## 2022-06-26 PROCEDURE — 84484 ASSAY OF TROPONIN QUANT: CPT | Performed by: EMERGENCY MEDICINE

## 2022-06-26 PROCEDURE — 84145 PROCALCITONIN (PCT): CPT | Performed by: EMERGENCY MEDICINE

## 2022-06-26 PROCEDURE — 10002800 HB RX 250 W HCPCS: Performed by: INTERNAL MEDICINE

## 2022-06-26 PROCEDURE — 80053 COMPREHEN METABOLIC PANEL: CPT | Performed by: EMERGENCY MEDICINE

## 2022-06-26 PROCEDURE — 83605 ASSAY OF LACTIC ACID: CPT | Performed by: EMERGENCY MEDICINE

## 2022-06-26 RX ORDER — OMEPRAZOLE 20 MG/1
20 CAPSULE, DELAYED RELEASE ORAL DAILY
COMMUNITY
Start: 2022-05-22

## 2022-06-26 RX ORDER — DEXAMETHASONE SODIUM PHOSPHATE 10 MG/ML
10 INJECTION, SOLUTION INTRAMUSCULAR; INTRAVENOUS ONCE
Status: COMPLETED | OUTPATIENT
Start: 2022-06-26 | End: 2022-06-26

## 2022-06-26 RX ORDER — CEFAZOLIN SODIUM/WATER 1 G/10 ML
1000 SYRINGE (ML) INTRAVENOUS DAILY
Status: DISCONTINUED | OUTPATIENT
Start: 2022-06-27 | End: 2022-06-30 | Stop reason: HOSPADM

## 2022-06-26 RX ORDER — VALSARTAN 160 MG/1
160 TABLET ORAL DAILY
Status: DISCONTINUED | OUTPATIENT
Start: 2022-06-27 | End: 2022-06-30 | Stop reason: HOSPADM

## 2022-06-26 RX ORDER — ACETAMINOPHEN 325 MG/1
650 TABLET ORAL ONCE
Status: COMPLETED | OUTPATIENT
Start: 2022-06-26 | End: 2022-06-26

## 2022-06-26 RX ORDER — ASCORBIC ACID 500 MG
1000 TABLET ORAL DAILY
Status: DISCONTINUED | OUTPATIENT
Start: 2022-06-26 | End: 2022-06-30 | Stop reason: HOSPADM

## 2022-06-26 RX ORDER — DOXYCYCLINE HYCLATE 100 MG/1
100 CAPSULE ORAL 2 TIMES DAILY
COMMUNITY
Start: 2022-06-25

## 2022-06-26 RX ORDER — HYDROCHLOROTHIAZIDE 25 MG/1
12.5 TABLET ORAL DAILY
COMMUNITY
Start: 2022-03-20

## 2022-06-26 RX ORDER — ACETAMINOPHEN 325 MG/1
650 TABLET ORAL EVERY 4 HOURS PRN
Status: DISCONTINUED | OUTPATIENT
Start: 2022-06-26 | End: 2022-06-30 | Stop reason: HOSPADM

## 2022-06-26 RX ORDER — ZINC SULFATE 50(220)MG
220 CAPSULE ORAL
Status: DISCONTINUED | OUTPATIENT
Start: 2022-06-26 | End: 2022-06-30 | Stop reason: HOSPADM

## 2022-06-26 RX ORDER — FLUTICASONE FUROATE AND VILANTEROL TRIFENATATE 200; 25 UG/1; UG/1
1 POWDER RESPIRATORY (INHALATION) DAILY
COMMUNITY
Start: 2022-05-16

## 2022-06-26 RX ORDER — MONTELUKAST SODIUM 10 MG/1
10 TABLET ORAL NIGHTLY
Status: DISCONTINUED | OUTPATIENT
Start: 2022-06-26 | End: 2022-06-30 | Stop reason: HOSPADM

## 2022-06-26 RX ORDER — LEVOTHYROXINE SODIUM 0.05 MG/1
50 TABLET ORAL DAILY
COMMUNITY
Start: 2022-05-22

## 2022-06-26 RX ORDER — 0.9 % SODIUM CHLORIDE 0.9 %
2 VIAL (ML) INJECTION EVERY 12 HOURS SCHEDULED
Status: DISCONTINUED | OUTPATIENT
Start: 2022-06-26 | End: 2022-06-30 | Stop reason: HOSPADM

## 2022-06-26 RX ORDER — FLUTICASONE FUROATE AND VILANTEROL 200; 25 UG/1; UG/1
1 POWDER RESPIRATORY (INHALATION)
Status: DISCONTINUED | OUTPATIENT
Start: 2022-06-27 | End: 2022-06-30 | Stop reason: HOSPADM

## 2022-06-26 RX ORDER — PROCHLORPERAZINE EDISYLATE 5 MG/ML
5 INJECTION INTRAMUSCULAR; INTRAVENOUS EVERY 4 HOURS PRN
Status: DISCONTINUED | OUTPATIENT
Start: 2022-06-26 | End: 2022-06-30 | Stop reason: HOSPADM

## 2022-06-26 RX ORDER — ASPIRIN 81 MG/1
81 TABLET ORAL NIGHTLY
COMMUNITY

## 2022-06-26 RX ORDER — CEFAZOLIN SODIUM/WATER 1 G/10 ML
1000 SYRINGE (ML) INTRAVENOUS ONCE
Status: COMPLETED | OUTPATIENT
Start: 2022-06-26 | End: 2022-06-26

## 2022-06-26 RX ORDER — DULOXETIN HYDROCHLORIDE 60 MG/1
60 CAPSULE, DELAYED RELEASE ORAL 2 TIMES DAILY
COMMUNITY
Start: 2022-05-25

## 2022-06-26 RX ORDER — ATORVASTATIN CALCIUM 20 MG/1
20 TABLET, FILM COATED ORAL NIGHTLY
Status: DISCONTINUED | OUTPATIENT
Start: 2022-06-26 | End: 2022-06-30 | Stop reason: HOSPADM

## 2022-06-26 RX ORDER — ENOXAPARIN SODIUM 100 MG/ML
40 INJECTION SUBCUTANEOUS DAILY
Status: DISCONTINUED | OUTPATIENT
Start: 2022-06-26 | End: 2022-06-30 | Stop reason: HOSPADM

## 2022-06-26 RX ORDER — MONTELUKAST SODIUM 10 MG/1
10 TABLET ORAL NIGHTLY
COMMUNITY
Start: 2022-05-22

## 2022-06-26 RX ORDER — GUAIFENESIN/DEXTROMETHORPHAN 100-10MG/5
10 SYRUP ORAL EVERY 4 HOURS PRN
Status: DISCONTINUED | OUTPATIENT
Start: 2022-06-26 | End: 2022-06-30 | Stop reason: HOSPADM

## 2022-06-26 RX ORDER — LORATADINE 10 MG/1
10 TABLET ORAL DAILY
COMMUNITY

## 2022-06-26 RX ORDER — LEVOTHYROXINE SODIUM 0.03 MG/1
50 TABLET ORAL DAILY
Status: DISCONTINUED | OUTPATIENT
Start: 2022-06-27 | End: 2022-06-30 | Stop reason: HOSPADM

## 2022-06-26 RX ORDER — LORATADINE 10 MG/1
10 TABLET ORAL DAILY
Status: DISCONTINUED | OUTPATIENT
Start: 2022-06-27 | End: 2022-06-30 | Stop reason: HOSPADM

## 2022-06-26 RX ORDER — CANDESARTAN 16 MG/1
16 TABLET ORAL DAILY
COMMUNITY
Start: 2022-05-25

## 2022-06-26 RX ORDER — FOLIC ACID 1 MG/1
1 TABLET ORAL NIGHTLY
COMMUNITY
Start: 2022-06-01

## 2022-06-26 RX ORDER — DOXYCYCLINE HYCLATE 100 MG/1
100 CAPSULE ORAL EVERY 12 HOURS SCHEDULED
Status: DISCONTINUED | OUTPATIENT
Start: 2022-06-26 | End: 2022-06-30 | Stop reason: HOSPADM

## 2022-06-26 RX ORDER — PREDNISONE 5 MG/1
5 TABLET ORAL DAILY
COMMUNITY
Start: 2022-06-16

## 2022-06-26 RX ORDER — PANTOPRAZOLE SODIUM 40 MG/1
40 TABLET, DELAYED RELEASE ORAL
Status: DISCONTINUED | OUTPATIENT
Start: 2022-06-27 | End: 2022-06-30 | Stop reason: HOSPADM

## 2022-06-26 RX ORDER — DEXAMETHASONE 4 MG/1
4 TABLET ORAL
Status: DISCONTINUED | OUTPATIENT
Start: 2022-06-27 | End: 2022-06-30 | Stop reason: HOSPADM

## 2022-06-26 RX ORDER — ATORVASTATIN CALCIUM 20 MG/1
20 TABLET, FILM COATED ORAL NIGHTLY
COMMUNITY
Start: 2022-05-22

## 2022-06-26 RX ORDER — METHOTREXATE 2.5 MG/1
20 TABLET ORAL
COMMUNITY
Start: 2022-04-26

## 2022-06-26 RX ORDER — SODIUM CHLORIDE AND POTASSIUM CHLORIDE 150; 450 MG/100ML; MG/100ML
INJECTION, SOLUTION INTRAVENOUS CONTINUOUS
Status: DISCONTINUED | OUTPATIENT
Start: 2022-06-26 | End: 2022-06-27

## 2022-06-26 RX ORDER — DULOXETIN HYDROCHLORIDE 60 MG/1
60 CAPSULE, DELAYED RELEASE ORAL 2 TIMES DAILY
Status: DISCONTINUED | OUTPATIENT
Start: 2022-06-26 | End: 2022-06-30 | Stop reason: HOSPADM

## 2022-06-26 RX ADMIN — OXYCODONE HYDROCHLORIDE AND ACETAMINOPHEN 1000 MG: 500 TABLET ORAL at 18:23

## 2022-06-26 RX ADMIN — ASPIRIN 81 MG: 81 TABLET, COATED ORAL at 18:23

## 2022-06-26 RX ADMIN — SODIUM CHLORIDE, PRESERVATIVE FREE 2 ML: 5 INJECTION INTRAVENOUS at 20:47

## 2022-06-26 RX ADMIN — DOXYCYCLINE 100 MG: 100 CAPSULE ORAL at 20:43

## 2022-06-26 RX ADMIN — MONTELUKAST SODIUM 10 MG: 10 TABLET, FILM COATED ORAL at 20:43

## 2022-06-26 RX ADMIN — ACETAMINOPHEN 650 MG: 325 TABLET ORAL at 14:34

## 2022-06-26 RX ADMIN — REMDESIVIR 200 MG: 100 INJECTION, POWDER, LYOPHILIZED, FOR SOLUTION INTRAVENOUS at 14:35

## 2022-06-26 RX ADMIN — DULOXETINE HYDROCHLORIDE 60 MG: 60 CAPSULE, DELAYED RELEASE ORAL at 20:44

## 2022-06-26 RX ADMIN — POTASSIUM CHLORIDE AND SODIUM CHLORIDE: 450; 150 INJECTION, SOLUTION INTRAVENOUS at 20:43

## 2022-06-26 RX ADMIN — ENOXAPARIN SODIUM 40 MG: 40 INJECTION SUBCUTANEOUS at 20:46

## 2022-06-26 RX ADMIN — SODIUM CHLORIDE 500 ML: 0.9 INJECTION, SOLUTION INTRAVENOUS at 12:58

## 2022-06-26 RX ADMIN — CEFTRIAXONE SODIUM 1000 MG: 100 INJECTION, POWDER, FOR SOLUTION INTRAVENOUS at 12:58

## 2022-06-26 RX ADMIN — ZINC SULFATE 220 MG (50 MG) CAPSULE 220 MG: CAPSULE at 18:23

## 2022-06-26 RX ADMIN — DEXAMETHASONE SODIUM PHOSPHATE 10 MG: 10 INJECTION, SOLUTION INTRAMUSCULAR; INTRAVENOUS at 14:35

## 2022-06-26 RX ADMIN — ATORVASTATIN CALCIUM 20 MG: 20 TABLET, FILM COATED ORAL at 20:46

## 2022-06-26 ASSESSMENT — ENCOUNTER SYMPTOMS
ABDOMINAL DISTENTION: 0
ADENOPATHY: 0
NAUSEA: 0
DIZZINESS: 0
ALLERGIC/IMMUNOLOGIC NEGATIVE: 1
CHEST TIGHTNESS: 0
ENDOCRINE NEGATIVE: 1
HEADACHES: 0
SHORTNESS OF BREATH: 1
COUGH: 1
SINUS PAIN: 0
ABDOMINAL PAIN: 0
CONFUSION: 0
CONSTITUTIONAL NEGATIVE: 1
EYES NEGATIVE: 1

## 2022-06-26 ASSESSMENT — ACTIVITIES OF DAILY LIVING (ADL)
ADL_BEFORE_ADMISSION: INDEPENDENT
RECENT_DECLINE_ADL: NO
ADL_SCORE: 12
ADL_SHORT_OF_BREATH: NO
CHRONIC_PAIN_PRESENT: NO

## 2022-06-26 ASSESSMENT — COGNITIVE AND FUNCTIONAL STATUS - GENERAL
DO YOU HAVE SERIOUS DIFFICULTY WALKING OR CLIMBING STAIRS: NO
BECAUSE OF A PHYSICAL, MENTAL, OR EMOTIONAL CONDITION, DO YOU HAVE SERIOUS DIFFICULTY CONCENTRATING, REMEMBERING OR MAKING DECISIONS: NO
BECAUSE OF A PHYSICAL, MENTAL, OR EMOTIONAL CONDITION, DO YOU HAVE DIFFICULTY DOING ERRANDS ALONE: NO
DO YOU HAVE DIFFICULTY DRESSING OR BATHING: NO
ARE YOU DEAF OR DO YOU HAVE SERIOUS DIFFICULTY  HEARING: NO
ARE YOU BLIND OR DO YOU HAVE SERIOUS DIFFICULTY SEEING, EVEN WHEN WEARING GLASSES: NO

## 2022-06-26 ASSESSMENT — PAIN SCALES - GENERAL
PAINLEVEL_OUTOF10: 3
PAINLEVEL_OUTOF10: 5

## 2022-06-26 ASSESSMENT — LIFESTYLE VARIABLES
HOW OFTEN DO YOU HAVE A DRINK CONTAINING ALCOHOL: NEVER
AUDIT-C TOTAL SCORE: 0
HOW MANY STANDARD DRINKS CONTAINING ALCOHOL DO YOU HAVE ON A TYPICAL DAY: 0,1 OR 2
ALCOHOL_USE_STATUS: NO OR LOW RISK WITH VALIDATED TOOL
HOW OFTEN DO YOU HAVE 6 OR MORE DRINKS ON ONE OCCASION: NEVER

## 2022-06-27 LAB
ALBUMIN SERPL-MCNC: 2.8 G/DL (ref 3.6–5.1)
ALBUMIN/GLOB SERPL: 0.8 {RATIO} (ref 1–2.4)
ALP SERPL-CCNC: 121 UNITS/L (ref 45–117)
ALT SERPL-CCNC: 27 UNITS/L
ANION GAP SERPL CALC-SCNC: 12 MMOL/L (ref 7–19)
AST SERPL-CCNC: 24 UNITS/L
ATRIAL RATE (BPM): 96
BASOPHILS # BLD: 0 K/MCL (ref 0–0.3)
BASOPHILS NFR BLD: 0 %
BILIRUB SERPL-MCNC: 0.3 MG/DL (ref 0.2–1)
BUN SERPL-MCNC: 17 MG/DL (ref 6–20)
BUN/CREAT SERPL: 25 (ref 7–25)
CALCIUM SERPL-MCNC: 9.1 MG/DL (ref 8.4–10.2)
CHLORIDE SERPL-SCNC: 107 MMOL/L (ref 97–110)
CO2 SERPL-SCNC: 23 MMOL/L (ref 21–32)
CREAT SERPL-MCNC: 0.68 MG/DL (ref 0.51–0.95)
CRP SERPL-MCNC: 4.6 MG/DL
D DIMER PPP FEU-MCNC: 0.33 MG/L (FEU)
DEPRECATED RDW RBC: 46.5 FL (ref 39–50)
EOSINOPHIL # BLD: 0 K/MCL (ref 0–0.5)
EOSINOPHIL NFR BLD: 0 %
ERYTHROCYTE [DISTWIDTH] IN BLOOD: 13.3 % (ref 11–15)
FASTING DURATION TIME PATIENT: ABNORMAL H
FERRITIN SERPL-MCNC: 75 NG/ML (ref 8–252)
GFR SERPLBLD BASED ON 1.73 SQ M-ARVRAT: >90 ML/MIN
GLOBULIN SER-MCNC: 3.5 G/DL (ref 2–4)
GLUCOSE SERPL-MCNC: 108 MG/DL (ref 70–99)
HCT VFR BLD CALC: 33.4 % (ref 36–46.5)
HGB BLD-MCNC: 11 G/DL (ref 12–15.5)
IMM GRANULOCYTES # BLD AUTO: 0 K/MCL (ref 0–0.2)
IMM GRANULOCYTES # BLD: 1 %
LDH SERPL L TO P-CCNC: 202 UNITS/L (ref 82–240)
LYMPHOCYTES # BLD: 0.6 K/MCL (ref 1–4)
LYMPHOCYTES NFR BLD: 12 %
MCH RBC QN AUTO: 31.7 PG (ref 26–34)
MCHC RBC AUTO-ENTMCNC: 32.9 G/DL (ref 32–36.5)
MCV RBC AUTO: 96.3 FL (ref 78–100)
MONOCYTES # BLD: 0.3 K/MCL (ref 0.3–0.9)
MONOCYTES NFR BLD: 6 %
NEUTROPHILS # BLD: 4 K/MCL (ref 1.8–7.7)
NEUTROPHILS NFR BLD: 81 %
NRBC BLD MANUAL-RTO: 0 /100 WBC
P AXIS (DEGREES): 49
PLATELET # BLD AUTO: 287 K/MCL (ref 140–450)
POTASSIUM SERPL-SCNC: 3.9 MMOL/L (ref 3.4–5.1)
PR-INTERVAL (MSEC): 124
PROT SERPL-MCNC: 6.3 G/DL (ref 6.4–8.2)
QRS-INTERVAL (MSEC): 76
QT-INTERVAL (MSEC): 380
QTC: 480
R AXIS (DEGREES): 19
RBC # BLD: 3.47 MIL/MCL (ref 4–5.2)
REPORT TEXT: NORMAL
SODIUM SERPL-SCNC: 138 MMOL/L (ref 135–145)
T AXIS (DEGREES): 78
VENTRICULAR RATE EKG/MIN (BPM): 96
WBC # BLD: 4.9 K/MCL (ref 4.2–11)

## 2022-06-27 PROCEDURE — 94640 AIRWAY INHALATION TREATMENT: CPT

## 2022-06-27 PROCEDURE — 10002803 HB RX 637: Performed by: HOSPITALIST

## 2022-06-27 PROCEDURE — 96376 TX/PRO/DX INJ SAME DRUG ADON: CPT

## 2022-06-27 PROCEDURE — 82728 ASSAY OF FERRITIN: CPT | Performed by: HOSPITALIST

## 2022-06-27 PROCEDURE — 10002800 HB RX 250 W HCPCS: Performed by: INTERNAL MEDICINE

## 2022-06-27 PROCEDURE — 10002807 HB RX 258: Performed by: EMERGENCY MEDICINE

## 2022-06-27 PROCEDURE — 96366 THER/PROPH/DIAG IV INF ADDON: CPT

## 2022-06-27 PROCEDURE — 80053 COMPREHEN METABOLIC PANEL: CPT | Performed by: EMERGENCY MEDICINE

## 2022-06-27 PROCEDURE — 83615 LACTATE (LD) (LDH) ENZYME: CPT | Performed by: HOSPITALIST

## 2022-06-27 PROCEDURE — 36415 COLL VENOUS BLD VENIPUNCTURE: CPT | Performed by: INTERNAL MEDICINE

## 2022-06-27 PROCEDURE — 85025 COMPLETE CBC W/AUTO DIFF WBC: CPT | Performed by: INTERNAL MEDICINE

## 2022-06-27 PROCEDURE — 10002803 HB RX 637: Performed by: INTERNAL MEDICINE

## 2022-06-27 PROCEDURE — 86140 C-REACTIVE PROTEIN: CPT | Performed by: HOSPITALIST

## 2022-06-27 PROCEDURE — 10002800 HB RX 250 W HCPCS: Performed by: EMERGENCY MEDICINE

## 2022-06-27 PROCEDURE — 10002801 HB RX 250 W/O HCPCS: Performed by: HOSPITALIST

## 2022-06-27 PROCEDURE — 10002800 HB RX 250 W HCPCS: Performed by: HOSPITALIST

## 2022-06-27 PROCEDURE — 10003585 HB ROOM CHARGE INTERMEDIATE CARE

## 2022-06-27 PROCEDURE — G0378 HOSPITAL OBSERVATION PER HR: HCPCS

## 2022-06-27 PROCEDURE — 10004651 HB RX, NO CHARGE ITEM: Performed by: HOSPITALIST

## 2022-06-27 PROCEDURE — 85379 FIBRIN DEGRADATION QUANT: CPT | Performed by: INTERNAL MEDICINE

## 2022-06-27 PROCEDURE — 96361 HYDRATE IV INFUSION ADD-ON: CPT

## 2022-06-27 RX ORDER — SODIUM CHLORIDE 9 MG/ML
INJECTION, SOLUTION INTRAVENOUS
Status: DISPENSED
Start: 2022-06-27 | End: 2022-06-27

## 2022-06-27 RX ADMIN — MONTELUKAST SODIUM 10 MG: 10 TABLET, FILM COATED ORAL at 20:29

## 2022-06-27 RX ADMIN — PANTOPRAZOLE SODIUM 40 MG: 40 TABLET, DELAYED RELEASE ORAL at 05:16

## 2022-06-27 RX ADMIN — ENOXAPARIN SODIUM 40 MG: 40 INJECTION SUBCUTANEOUS at 20:29

## 2022-06-27 RX ADMIN — ATORVASTATIN CALCIUM 20 MG: 20 TABLET, FILM COATED ORAL at 20:36

## 2022-06-27 RX ADMIN — POTASSIUM CHLORIDE AND SODIUM CHLORIDE: 450; 150 INJECTION, SOLUTION INTRAVENOUS at 09:41

## 2022-06-27 RX ADMIN — OXYCODONE HYDROCHLORIDE AND ACETAMINOPHEN 1000 MG: 500 TABLET ORAL at 09:42

## 2022-06-27 RX ADMIN — LORATADINE 10 MG: 10 TABLET ORAL at 09:41

## 2022-06-27 RX ADMIN — DOXYCYCLINE 100 MG: 100 CAPSULE ORAL at 20:29

## 2022-06-27 RX ADMIN — DOXYCYCLINE 100 MG: 100 CAPSULE ORAL at 09:41

## 2022-06-27 RX ADMIN — DEXAMETHASONE 4 MG: 4 TABLET ORAL at 09:42

## 2022-06-27 RX ADMIN — ZINC SULFATE 220 MG (50 MG) CAPSULE 220 MG: CAPSULE at 18:14

## 2022-06-27 RX ADMIN — VALSARTAN 160 MG: 160 TABLET, FILM COATED ORAL at 09:42

## 2022-06-27 RX ADMIN — ASPIRIN 81 MG: 81 TABLET, COATED ORAL at 09:42

## 2022-06-27 RX ADMIN — DULOXETINE HYDROCHLORIDE 60 MG: 60 CAPSULE, DELAYED RELEASE ORAL at 09:41

## 2022-06-27 RX ADMIN — LEVOTHYROXINE SODIUM 50 MCG: 0.03 TABLET ORAL at 09:41

## 2022-06-27 RX ADMIN — DULOXETINE HYDROCHLORIDE 60 MG: 60 CAPSULE, DELAYED RELEASE ORAL at 20:29

## 2022-06-27 RX ADMIN — SODIUM CHLORIDE, PRESERVATIVE FREE 2 ML: 5 INJECTION INTRAVENOUS at 20:29

## 2022-06-27 RX ADMIN — FLUTICASONE FUROATE AND VILANTEROL TRIFENATATE 1 PUFF: 200; 25 POWDER RESPIRATORY (INHALATION) at 10:12

## 2022-06-27 RX ADMIN — SODIUM CHLORIDE, PRESERVATIVE FREE 2 ML: 5 INJECTION INTRAVENOUS at 09:42

## 2022-06-27 RX ADMIN — ACETAMINOPHEN 650 MG: 325 TABLET ORAL at 18:14

## 2022-06-27 RX ADMIN — REMDESIVIR 100 MG: 100 INJECTION, POWDER, LYOPHILIZED, FOR SOLUTION INTRAVENOUS at 13:19

## 2022-06-27 RX ADMIN — ACETAMINOPHEN 650 MG: 325 TABLET ORAL at 05:20

## 2022-06-27 RX ADMIN — CEFTRIAXONE SODIUM 1000 MG: 10 INJECTION, POWDER, FOR SOLUTION INTRAVENOUS at 09:41

## 2022-06-27 ASSESSMENT — PAIN SCALES - GENERAL
PAINLEVEL_OUTOF10: 0
PAINLEVEL_OUTOF10: 0
PAINLEVEL_OUTOF10: 2
PAINLEVEL_OUTOF10: 6
PAINLEVEL_OUTOF10: 0

## 2022-06-27 ASSESSMENT — COGNITIVE AND FUNCTIONAL STATUS - GENERAL
DO YOU HAVE SERIOUS DIFFICULTY WALKING OR CLIMBING STAIRS: NO
DO YOU HAVE DIFFICULTY DRESSING OR BATHING: NO
BECAUSE OF A PHYSICAL, MENTAL, OR EMOTIONAL CONDITION, DO YOU HAVE SERIOUS DIFFICULTY CONCENTRATING, REMEMBERING OR MAKING DECISIONS: NO
BECAUSE OF A PHYSICAL, MENTAL, OR EMOTIONAL CONDITION, DO YOU HAVE DIFFICULTY DOING ERRANDS ALONE: NO

## 2022-06-28 LAB
ALBUMIN SERPL-MCNC: 3 G/DL (ref 3.6–5.1)
ALBUMIN/GLOB SERPL: 0.8 {RATIO} (ref 1–2.4)
ALP SERPL-CCNC: 143 UNITS/L (ref 45–117)
ALT SERPL-CCNC: 24 UNITS/L
ANION GAP SERPL CALC-SCNC: 11 MMOL/L (ref 7–19)
AST SERPL-CCNC: 13 UNITS/L
BILIRUB SERPL-MCNC: 0.3 MG/DL (ref 0.2–1)
BUN SERPL-MCNC: 18 MG/DL (ref 6–20)
BUN/CREAT SERPL: 22 (ref 7–25)
CALCIUM SERPL-MCNC: 9.3 MG/DL (ref 8.4–10.2)
CHLORIDE SERPL-SCNC: 107 MMOL/L (ref 97–110)
CO2 SERPL-SCNC: 25 MMOL/L (ref 21–32)
CREAT SERPL-MCNC: 0.82 MG/DL (ref 0.51–0.95)
CRP SERPL-MCNC: 2.3 MG/DL
D DIMER PPP FEU-MCNC: 0.26 MG/L (FEU)
FASTING DURATION TIME PATIENT: ABNORMAL H
FERRITIN SERPL-MCNC: 66 NG/ML (ref 8–252)
GFR SERPLBLD BASED ON 1.73 SQ M-ARVRAT: 75 ML/MIN
GLOBULIN SER-MCNC: 3.7 G/DL (ref 2–4)
GLUCOSE SERPL-MCNC: 89 MG/DL (ref 70–99)
LDH SERPL L TO P-CCNC: 208 UNITS/L (ref 82–240)
POTASSIUM SERPL-SCNC: 3.8 MMOL/L (ref 3.4–5.1)
PROT SERPL-MCNC: 6.7 G/DL (ref 6.4–8.2)
RAINBOW EXTRA TUBES HOLD SPECIMEN: NORMAL
SODIUM SERPL-SCNC: 139 MMOL/L (ref 135–145)

## 2022-06-28 PROCEDURE — 10002801 HB RX 250 W/O HCPCS: Performed by: HOSPITALIST

## 2022-06-28 PROCEDURE — 82728 ASSAY OF FERRITIN: CPT | Performed by: HOSPITALIST

## 2022-06-28 PROCEDURE — 10002800 HB RX 250 W HCPCS: Performed by: EMERGENCY MEDICINE

## 2022-06-28 PROCEDURE — 10002807 HB RX 258: Performed by: EMERGENCY MEDICINE

## 2022-06-28 PROCEDURE — 94640 AIRWAY INHALATION TREATMENT: CPT

## 2022-06-28 PROCEDURE — 10002803 HB RX 637: Performed by: INTERNAL MEDICINE

## 2022-06-28 PROCEDURE — 80053 COMPREHEN METABOLIC PANEL: CPT | Performed by: EMERGENCY MEDICINE

## 2022-06-28 PROCEDURE — 94664 DEMO&/EVAL PT USE INHALER: CPT

## 2022-06-28 PROCEDURE — 36415 COLL VENOUS BLD VENIPUNCTURE: CPT | Performed by: HOSPITALIST

## 2022-06-28 PROCEDURE — 10002800 HB RX 250 W HCPCS: Performed by: HOSPITALIST

## 2022-06-28 PROCEDURE — 85379 FIBRIN DEGRADATION QUANT: CPT | Performed by: HOSPITALIST

## 2022-06-28 PROCEDURE — 10002803 HB RX 637: Performed by: HOSPITALIST

## 2022-06-28 PROCEDURE — 83615 LACTATE (LD) (LDH) ENZYME: CPT | Performed by: HOSPITALIST

## 2022-06-28 PROCEDURE — 10003585 HB ROOM CHARGE INTERMEDIATE CARE

## 2022-06-28 PROCEDURE — 10004651 HB RX, NO CHARGE ITEM: Performed by: HOSPITALIST

## 2022-06-28 PROCEDURE — 86140 C-REACTIVE PROTEIN: CPT | Performed by: HOSPITALIST

## 2022-06-28 RX ORDER — POTASSIUM CHLORIDE 20 MEQ/1
40 TABLET, EXTENDED RELEASE ORAL ONCE
Status: COMPLETED | OUTPATIENT
Start: 2022-06-28 | End: 2022-06-28

## 2022-06-28 RX ADMIN — OXYCODONE HYDROCHLORIDE AND ACETAMINOPHEN 1000 MG: 500 TABLET ORAL at 08:36

## 2022-06-28 RX ADMIN — ASPIRIN 81 MG: 81 TABLET, COATED ORAL at 08:36

## 2022-06-28 RX ADMIN — FLUTICASONE FUROATE AND VILANTEROL TRIFENATATE 1 PUFF: 200; 25 POWDER RESPIRATORY (INHALATION) at 09:20

## 2022-06-28 RX ADMIN — SODIUM CHLORIDE, PRESERVATIVE FREE 2 ML: 5 INJECTION INTRAVENOUS at 08:36

## 2022-06-28 RX ADMIN — ACETAMINOPHEN 650 MG: 325 TABLET ORAL at 11:27

## 2022-06-28 RX ADMIN — DOXYCYCLINE 100 MG: 100 CAPSULE ORAL at 20:01

## 2022-06-28 RX ADMIN — REMDESIVIR 100 MG: 100 INJECTION, POWDER, LYOPHILIZED, FOR SOLUTION INTRAVENOUS at 11:29

## 2022-06-28 RX ADMIN — SODIUM CHLORIDE, PRESERVATIVE FREE 2 ML: 5 INJECTION INTRAVENOUS at 20:04

## 2022-06-28 RX ADMIN — DEXAMETHASONE 4 MG: 4 TABLET ORAL at 08:36

## 2022-06-28 RX ADMIN — VALSARTAN 160 MG: 160 TABLET, FILM COATED ORAL at 08:36

## 2022-06-28 RX ADMIN — ATORVASTATIN CALCIUM 20 MG: 20 TABLET, FILM COATED ORAL at 20:01

## 2022-06-28 RX ADMIN — MONTELUKAST SODIUM 10 MG: 10 TABLET, FILM COATED ORAL at 20:01

## 2022-06-28 RX ADMIN — POTASSIUM CHLORIDE 40 MEQ: 1500 TABLET, EXTENDED RELEASE ORAL at 23:03

## 2022-06-28 RX ADMIN — DULOXETINE HYDROCHLORIDE 60 MG: 60 CAPSULE, DELAYED RELEASE ORAL at 08:36

## 2022-06-28 RX ADMIN — DULOXETINE HYDROCHLORIDE 60 MG: 60 CAPSULE, DELAYED RELEASE ORAL at 20:01

## 2022-06-28 RX ADMIN — DOXYCYCLINE 100 MG: 100 CAPSULE ORAL at 08:36

## 2022-06-28 RX ADMIN — CEFTRIAXONE SODIUM 1000 MG: 10 INJECTION, POWDER, FOR SOLUTION INTRAVENOUS at 08:35

## 2022-06-28 RX ADMIN — LORATADINE 10 MG: 10 TABLET ORAL at 08:36

## 2022-06-28 RX ADMIN — ACETAMINOPHEN 650 MG: 325 TABLET ORAL at 05:57

## 2022-06-28 RX ADMIN — ACETAMINOPHEN 650 MG: 325 TABLET ORAL at 23:04

## 2022-06-28 RX ADMIN — LEVOTHYROXINE SODIUM 50 MCG: 0.03 TABLET ORAL at 08:35

## 2022-06-28 RX ADMIN — PANTOPRAZOLE SODIUM 40 MG: 40 TABLET, DELAYED RELEASE ORAL at 05:36

## 2022-06-28 RX ADMIN — ZINC SULFATE 220 MG (50 MG) CAPSULE 220 MG: CAPSULE at 20:14

## 2022-06-28 RX ADMIN — ENOXAPARIN SODIUM 40 MG: 40 INJECTION SUBCUTANEOUS at 20:02

## 2022-06-28 ASSESSMENT — PAIN SCALES - GENERAL
PAINLEVEL_OUTOF10: 4
PAINLEVEL_OUTOF10: 5
PAINLEVEL_OUTOF10: 0
PAINLEVEL_OUTOF10: 0
PAINLEVEL_OUTOF10: 5
PAINLEVEL_OUTOF10: 2

## 2022-06-29 ENCOUNTER — APPOINTMENT (OUTPATIENT)
Dept: ULTRASOUND IMAGING | Age: 72
DRG: 177 | End: 2022-06-29
Attending: INTERNAL MEDICINE

## 2022-06-29 LAB
ALBUMIN SERPL-MCNC: 2.8 G/DL (ref 3.6–5.1)
ALBUMIN/GLOB SERPL: 0.8 {RATIO} (ref 1–2.4)
ALP SERPL-CCNC: 129 UNITS/L (ref 45–117)
ALT SERPL-CCNC: 20 UNITS/L
ANION GAP SERPL CALC-SCNC: 10 MMOL/L (ref 7–19)
AST SERPL-CCNC: 11 UNITS/L
BILIRUB SERPL-MCNC: 0.3 MG/DL (ref 0.2–1)
BUN SERPL-MCNC: 17 MG/DL (ref 6–20)
BUN/CREAT SERPL: 23 (ref 7–25)
CALCIUM SERPL-MCNC: 9.3 MG/DL (ref 8.4–10.2)
CHLORIDE SERPL-SCNC: 109 MMOL/L (ref 97–110)
CO2 SERPL-SCNC: 23 MMOL/L (ref 21–32)
CREAT SERPL-MCNC: 0.73 MG/DL (ref 0.51–0.95)
CRP SERPL-MCNC: 1.2 MG/DL
D DIMER PPP FEU-MCNC: 0.19 MG/L (FEU)
FASTING DURATION TIME PATIENT: ABNORMAL H
FERRITIN SERPL-MCNC: 50 NG/ML (ref 8–252)
GFR SERPLBLD BASED ON 1.73 SQ M-ARVRAT: 87 ML/MIN
GLOBULIN SER-MCNC: 3.4 G/DL (ref 2–4)
GLUCOSE SERPL-MCNC: 90 MG/DL (ref 70–99)
LDH SERPL L TO P-CCNC: 197 UNITS/L (ref 82–240)
POTASSIUM SERPL-SCNC: 4.2 MMOL/L (ref 3.4–5.1)
PROT SERPL-MCNC: 6.2 G/DL (ref 6.4–8.2)
SODIUM SERPL-SCNC: 138 MMOL/L (ref 135–145)

## 2022-06-29 PROCEDURE — 10002801 HB RX 250 W/O HCPCS: Performed by: HOSPITALIST

## 2022-06-29 PROCEDURE — 10003585 HB ROOM CHARGE INTERMEDIATE CARE

## 2022-06-29 PROCEDURE — 85379 FIBRIN DEGRADATION QUANT: CPT | Performed by: HOSPITALIST

## 2022-06-29 PROCEDURE — 87899 AGENT NOS ASSAY W/OPTIC: CPT | Performed by: INTERNAL MEDICINE

## 2022-06-29 PROCEDURE — 83615 LACTATE (LD) (LDH) ENZYME: CPT | Performed by: HOSPITALIST

## 2022-06-29 PROCEDURE — 93971 EXTREMITY STUDY: CPT

## 2022-06-29 PROCEDURE — 10002800 HB RX 250 W HCPCS: Performed by: EMERGENCY MEDICINE

## 2022-06-29 PROCEDURE — 10002807 HB RX 258: Performed by: EMERGENCY MEDICINE

## 2022-06-29 PROCEDURE — 36415 COLL VENOUS BLD VENIPUNCTURE: CPT | Performed by: HOSPITALIST

## 2022-06-29 PROCEDURE — 80053 COMPREHEN METABOLIC PANEL: CPT | Performed by: EMERGENCY MEDICINE

## 2022-06-29 PROCEDURE — 10004651 HB RX, NO CHARGE ITEM: Performed by: HOSPITALIST

## 2022-06-29 PROCEDURE — 94640 AIRWAY INHALATION TREATMENT: CPT

## 2022-06-29 PROCEDURE — 10002803 HB RX 637: Performed by: HOSPITALIST

## 2022-06-29 PROCEDURE — 82728 ASSAY OF FERRITIN: CPT | Performed by: HOSPITALIST

## 2022-06-29 PROCEDURE — 86140 C-REACTIVE PROTEIN: CPT | Performed by: HOSPITALIST

## 2022-06-29 PROCEDURE — 10002800 HB RX 250 W HCPCS: Performed by: HOSPITALIST

## 2022-06-29 PROCEDURE — 10002803 HB RX 637: Performed by: INTERNAL MEDICINE

## 2022-06-29 RX ADMIN — LORATADINE 10 MG: 10 TABLET ORAL at 10:09

## 2022-06-29 RX ADMIN — VALSARTAN 160 MG: 160 TABLET, FILM COATED ORAL at 10:10

## 2022-06-29 RX ADMIN — DULOXETINE HYDROCHLORIDE 60 MG: 60 CAPSULE, DELAYED RELEASE ORAL at 10:09

## 2022-06-29 RX ADMIN — PANTOPRAZOLE SODIUM 40 MG: 40 TABLET, DELAYED RELEASE ORAL at 05:15

## 2022-06-29 RX ADMIN — ZINC SULFATE 220 MG (50 MG) CAPSULE 220 MG: CAPSULE at 17:17

## 2022-06-29 RX ADMIN — DOXYCYCLINE 100 MG: 100 CAPSULE ORAL at 20:59

## 2022-06-29 RX ADMIN — OXYCODONE HYDROCHLORIDE AND ACETAMINOPHEN 1000 MG: 500 TABLET ORAL at 10:08

## 2022-06-29 RX ADMIN — ASPIRIN 81 MG: 81 TABLET, COATED ORAL at 10:09

## 2022-06-29 RX ADMIN — ACETAMINOPHEN 650 MG: 325 TABLET ORAL at 20:59

## 2022-06-29 RX ADMIN — DULOXETINE HYDROCHLORIDE 60 MG: 60 CAPSULE, DELAYED RELEASE ORAL at 20:59

## 2022-06-29 RX ADMIN — ENOXAPARIN SODIUM 40 MG: 40 INJECTION SUBCUTANEOUS at 21:57

## 2022-06-29 RX ADMIN — SODIUM CHLORIDE, PRESERVATIVE FREE 2 ML: 5 INJECTION INTRAVENOUS at 10:11

## 2022-06-29 RX ADMIN — ATORVASTATIN CALCIUM 20 MG: 20 TABLET, FILM COATED ORAL at 20:59

## 2022-06-29 RX ADMIN — LEVOTHYROXINE SODIUM 50 MCG: 0.03 TABLET ORAL at 10:09

## 2022-06-29 RX ADMIN — DOXYCYCLINE 100 MG: 100 CAPSULE ORAL at 10:10

## 2022-06-29 RX ADMIN — CEFTRIAXONE SODIUM 1000 MG: 10 INJECTION, POWDER, FOR SOLUTION INTRAVENOUS at 10:11

## 2022-06-29 RX ADMIN — MONTELUKAST SODIUM 10 MG: 10 TABLET, FILM COATED ORAL at 20:59

## 2022-06-29 RX ADMIN — REMDESIVIR 100 MG: 100 INJECTION, POWDER, LYOPHILIZED, FOR SOLUTION INTRAVENOUS at 12:34

## 2022-06-29 RX ADMIN — DEXAMETHASONE 4 MG: 4 TABLET ORAL at 10:10

## 2022-06-29 RX ADMIN — SODIUM CHLORIDE, PRESERVATIVE FREE 2 ML: 5 INJECTION INTRAVENOUS at 21:57

## 2022-06-29 RX ADMIN — FLUTICASONE FUROATE AND VILANTEROL TRIFENATATE 1 PUFF: 200; 25 POWDER RESPIRATORY (INHALATION) at 09:17

## 2022-06-29 ASSESSMENT — PAIN SCALES - GENERAL
PAINLEVEL_OUTOF10: 0
PAINLEVEL_OUTOF10: 0
PAINLEVEL_OUTOF10: 3

## 2022-06-30 VITALS
HEART RATE: 85 BPM | BODY MASS INDEX: 27.49 KG/M2 | TEMPERATURE: 97.2 F | HEIGHT: 64 IN | OXYGEN SATURATION: 95 % | DIASTOLIC BLOOD PRESSURE: 77 MMHG | RESPIRATION RATE: 20 BRPM | SYSTOLIC BLOOD PRESSURE: 139 MMHG | WEIGHT: 161 LBS

## 2022-06-30 LAB
ALBUMIN SERPL-MCNC: 2.7 G/DL (ref 3.6–5.1)
ALBUMIN/GLOB SERPL: 0.8 {RATIO} (ref 1–2.4)
ALP SERPL-CCNC: 122 UNITS/L (ref 45–117)
ALT SERPL-CCNC: 18 UNITS/L
ANION GAP SERPL CALC-SCNC: 11 MMOL/L (ref 7–19)
AST SERPL-CCNC: 15 UNITS/L
BILIRUB SERPL-MCNC: 0.3 MG/DL (ref 0.2–1)
BUN SERPL-MCNC: 20 MG/DL (ref 6–20)
BUN/CREAT SERPL: 25 (ref 7–25)
CALCIUM SERPL-MCNC: 9.3 MG/DL (ref 8.4–10.2)
CHLORIDE SERPL-SCNC: 105 MMOL/L (ref 97–110)
CO2 SERPL-SCNC: 28 MMOL/L (ref 21–32)
CREAT SERPL-MCNC: 0.81 MG/DL (ref 0.51–0.95)
CRP SERPL-MCNC: 0.8 MG/DL
D DIMER PPP FEU-MCNC: <0.19 MG/L (FEU)
FASTING DURATION TIME PATIENT: ABNORMAL H
FERRITIN SERPL-MCNC: 43 NG/ML (ref 8–252)
GFR SERPLBLD BASED ON 1.73 SQ M-ARVRAT: 77 ML/MIN
GLOBULIN SER-MCNC: 3.2 G/DL (ref 2–4)
GLUCOSE SERPL-MCNC: 86 MG/DL (ref 70–99)
L PNEUMO1 AG UR QL IA.RAPID: NORMAL
LDH SERPL L TO P-CCNC: 181 UNITS/L (ref 82–240)
POTASSIUM SERPL-SCNC: 3.8 MMOL/L (ref 3.4–5.1)
PROT SERPL-MCNC: 5.9 G/DL (ref 6.4–8.2)
RAINBOW EXTRA TUBES HOLD SPECIMEN: NORMAL
RAINBOW EXTRA TUBES HOLD SPECIMEN: NORMAL
S PNEUM AG UR QL IA.RAPID: NORMAL
SODIUM SERPL-SCNC: 140 MMOL/L (ref 135–145)

## 2022-06-30 PROCEDURE — 94640 AIRWAY INHALATION TREATMENT: CPT

## 2022-06-30 PROCEDURE — 86140 C-REACTIVE PROTEIN: CPT | Performed by: HOSPITALIST

## 2022-06-30 PROCEDURE — 85379 FIBRIN DEGRADATION QUANT: CPT | Performed by: HOSPITALIST

## 2022-06-30 PROCEDURE — 82728 ASSAY OF FERRITIN: CPT | Performed by: HOSPITALIST

## 2022-06-30 PROCEDURE — 83615 LACTATE (LD) (LDH) ENZYME: CPT | Performed by: HOSPITALIST

## 2022-06-30 PROCEDURE — 80053 COMPREHEN METABOLIC PANEL: CPT | Performed by: EMERGENCY MEDICINE

## 2022-06-30 PROCEDURE — 10002803 HB RX 637: Performed by: HOSPITALIST

## 2022-06-30 PROCEDURE — 10002803 HB RX 637: Performed by: INTERNAL MEDICINE

## 2022-06-30 PROCEDURE — 10002807 HB RX 258: Performed by: EMERGENCY MEDICINE

## 2022-06-30 PROCEDURE — 36415 COLL VENOUS BLD VENIPUNCTURE: CPT | Performed by: HOSPITALIST

## 2022-06-30 PROCEDURE — 10002800 HB RX 250 W HCPCS: Performed by: EMERGENCY MEDICINE

## 2022-06-30 PROCEDURE — 10004651 HB RX, NO CHARGE ITEM: Performed by: HOSPITALIST

## 2022-06-30 RX ADMIN — FLUTICASONE FUROATE AND VILANTEROL TRIFENATATE 1 PUFF: 200; 25 POWDER RESPIRATORY (INHALATION) at 08:32

## 2022-06-30 RX ADMIN — ASPIRIN 81 MG: 81 TABLET, COATED ORAL at 09:08

## 2022-06-30 RX ADMIN — CEFTRIAXONE SODIUM 1000 MG: 10 INJECTION, POWDER, FOR SOLUTION INTRAVENOUS at 09:08

## 2022-06-30 RX ADMIN — DEXAMETHASONE 4 MG: 4 TABLET ORAL at 09:12

## 2022-06-30 RX ADMIN — LORATADINE 10 MG: 10 TABLET ORAL at 09:12

## 2022-06-30 RX ADMIN — SODIUM CHLORIDE, PRESERVATIVE FREE 2 ML: 5 INJECTION INTRAVENOUS at 09:05

## 2022-06-30 RX ADMIN — OXYCODONE HYDROCHLORIDE AND ACETAMINOPHEN 1000 MG: 500 TABLET ORAL at 09:09

## 2022-06-30 RX ADMIN — PANTOPRAZOLE SODIUM 40 MG: 40 TABLET, DELAYED RELEASE ORAL at 05:50

## 2022-06-30 RX ADMIN — REMDESIVIR 100 MG: 100 INJECTION, POWDER, LYOPHILIZED, FOR SOLUTION INTRAVENOUS at 12:31

## 2022-06-30 RX ADMIN — LEVOTHYROXINE SODIUM 50 MCG: 0.03 TABLET ORAL at 09:12

## 2022-06-30 RX ADMIN — DOXYCYCLINE 100 MG: 100 CAPSULE ORAL at 09:12

## 2022-06-30 RX ADMIN — DULOXETINE HYDROCHLORIDE 60 MG: 60 CAPSULE, DELAYED RELEASE ORAL at 09:09

## 2022-06-30 RX ADMIN — VALSARTAN 160 MG: 160 TABLET, FILM COATED ORAL at 09:11

## 2022-06-30 ASSESSMENT — PAIN SCALES - GENERAL: PAINLEVEL_OUTOF10: 0

## 2022-07-01 LAB
BACTERIA BLD CULT: NORMAL
BACTERIA BLD CULT: NORMAL

## 2022-07-05 ENCOUNTER — CASE MANAGEMENT (OUTPATIENT)
Dept: CARE COORDINATION | Age: 72
End: 2022-07-05

## 2022-07-12 ENCOUNTER — CASE MANAGEMENT (OUTPATIENT)
Dept: CARE COORDINATION | Age: 72
End: 2022-07-12

## 2024-12-03 ENCOUNTER — HOSPITAL ENCOUNTER (EMERGENCY)
Facility: HOSPITAL | Age: 74
Discharge: HOME | End: 2024-12-03
Attending: EMERGENCY MEDICINE
Payer: MEDICARE

## 2024-12-03 ENCOUNTER — APPOINTMENT (OUTPATIENT)
Dept: CT IMAGING | Facility: HOSPITAL | Age: 74
End: 2024-12-03
Attending: EMERGENCY MEDICINE
Payer: MEDICARE

## 2024-12-03 ENCOUNTER — APPOINTMENT (OUTPATIENT)
Dept: GENERAL RADIOLOGY | Facility: HOSPITAL | Age: 74
End: 2024-12-03
Attending: EMERGENCY MEDICINE
Payer: MEDICARE

## 2024-12-03 VITALS
OXYGEN SATURATION: 100 % | SYSTOLIC BLOOD PRESSURE: 119 MMHG | RESPIRATION RATE: 16 BRPM | HEART RATE: 62 BPM | DIASTOLIC BLOOD PRESSURE: 49 MMHG | TEMPERATURE: 97 F

## 2024-12-03 DIAGNOSIS — R07.89 CHEST WALL PAIN: Primary | ICD-10-CM

## 2024-12-03 DIAGNOSIS — M79.7 FIBROMYALGIA: ICD-10-CM

## 2024-12-03 LAB
ANION GAP SERPL CALC-SCNC: 6 MMOL/L (ref 0–18)
ATRIAL RATE: 66 BPM
BASOPHILS # BLD AUTO: 0.1 X10(3) UL (ref 0–0.2)
BASOPHILS NFR BLD AUTO: 1.1 %
BUN BLD-MCNC: 28 MG/DL (ref 9–23)
BUN/CREAT SERPL: 22.4 (ref 10–20)
CALCIUM BLD-MCNC: 10 MG/DL (ref 8.7–10.4)
CHLORIDE SERPL-SCNC: 106 MMOL/L (ref 98–112)
CO2 SERPL-SCNC: 26 MMOL/L (ref 21–32)
CREAT BLD-MCNC: 1.25 MG/DL
D DIMER PPP FEU-MCNC: 0.94 UG/ML FEU (ref ?–0.75)
DEPRECATED RDW RBC AUTO: 49.7 FL (ref 35.1–46.3)
EGFRCR SERPLBLD CKD-EPI 2021: 45 ML/MIN/1.73M2 (ref 60–?)
EOSINOPHIL # BLD AUTO: 0.26 X10(3) UL (ref 0–0.7)
EOSINOPHIL NFR BLD AUTO: 2.9 %
ERYTHROCYTE [DISTWIDTH] IN BLOOD BY AUTOMATED COUNT: 14.8 % (ref 11–15)
FLUAV + FLUBV RNA SPEC NAA+PROBE: NEGATIVE
FLUAV + FLUBV RNA SPEC NAA+PROBE: NEGATIVE
GLUCOSE BLD-MCNC: 94 MG/DL (ref 70–99)
HCT VFR BLD AUTO: 41.4 %
HGB BLD-MCNC: 13.6 G/DL
IMM GRANULOCYTES # BLD AUTO: 0.02 X10(3) UL (ref 0–1)
IMM GRANULOCYTES NFR BLD: 0.2 %
LYMPHOCYTES # BLD AUTO: 2.41 X10(3) UL (ref 1–4)
LYMPHOCYTES NFR BLD AUTO: 27.2 %
MCH RBC QN AUTO: 30.4 PG (ref 26–34)
MCHC RBC AUTO-ENTMCNC: 32.9 G/DL (ref 31–37)
MCV RBC AUTO: 92.6 FL
MONOCYTES # BLD AUTO: 0.54 X10(3) UL (ref 0.1–1)
MONOCYTES NFR BLD AUTO: 6.1 %
NEUTROPHILS # BLD AUTO: 5.54 X10 (3) UL (ref 1.5–7.7)
NEUTROPHILS # BLD AUTO: 5.54 X10(3) UL (ref 1.5–7.7)
NEUTROPHILS NFR BLD AUTO: 62.5 %
OSMOLALITY SERPL CALC.SUM OF ELEC: 291 MOSM/KG (ref 275–295)
P AXIS: 34 DEGREES
P-R INTERVAL: 120 MS
PLATELET # BLD AUTO: 299 10(3)UL (ref 150–450)
POTASSIUM SERPL-SCNC: 3.7 MMOL/L (ref 3.5–5.1)
Q-T INTERVAL: 478 MS
QRS DURATION: 92 MS
QTC CALCULATION (BEZET): 501 MS
R AXIS: 15 DEGREES
RBC # BLD AUTO: 4.47 X10(6)UL
RSV RNA SPEC NAA+PROBE: NEGATIVE
SARS-COV-2 RNA RESP QL NAA+PROBE: NOT DETECTED
SODIUM SERPL-SCNC: 138 MMOL/L (ref 136–145)
T AXIS: 70 DEGREES
TROPONIN I SERPL HS-MCNC: <3 NG/L
VENTRICULAR RATE: 66 BPM
WBC # BLD AUTO: 8.9 X10(3) UL (ref 4–11)

## 2024-12-03 PROCEDURE — 80048 BASIC METABOLIC PNL TOTAL CA: CPT | Performed by: EMERGENCY MEDICINE

## 2024-12-03 PROCEDURE — 0241U SARS-COV-2/FLU A AND B/RSV BY PCR (GENEXPERT): CPT | Performed by: EMERGENCY MEDICINE

## 2024-12-03 PROCEDURE — 96374 THER/PROPH/DIAG INJ IV PUSH: CPT

## 2024-12-03 PROCEDURE — 93005 ELECTROCARDIOGRAM TRACING: CPT

## 2024-12-03 PROCEDURE — 93010 ELECTROCARDIOGRAM REPORT: CPT

## 2024-12-03 PROCEDURE — 71045 X-RAY EXAM CHEST 1 VIEW: CPT | Performed by: EMERGENCY MEDICINE

## 2024-12-03 PROCEDURE — 85379 FIBRIN DEGRADATION QUANT: CPT | Performed by: EMERGENCY MEDICINE

## 2024-12-03 PROCEDURE — 85025 COMPLETE CBC W/AUTO DIFF WBC: CPT | Performed by: EMERGENCY MEDICINE

## 2024-12-03 PROCEDURE — 71260 CT THORAX DX C+: CPT | Performed by: EMERGENCY MEDICINE

## 2024-12-03 PROCEDURE — 99284 EMERGENCY DEPT VISIT MOD MDM: CPT

## 2024-12-03 PROCEDURE — 84484 ASSAY OF TROPONIN QUANT: CPT | Performed by: EMERGENCY MEDICINE

## 2024-12-03 PROCEDURE — 99285 EMERGENCY DEPT VISIT HI MDM: CPT

## 2024-12-03 PROCEDURE — 96375 TX/PRO/DX INJ NEW DRUG ADDON: CPT

## 2024-12-03 RX ORDER — MORPHINE SULFATE 4 MG/ML
4 INJECTION, SOLUTION INTRAMUSCULAR; INTRAVENOUS ONCE
Status: COMPLETED | OUTPATIENT
Start: 2024-12-03 | End: 2024-12-03

## 2024-12-03 RX ORDER — METHYLPREDNISOLONE SODIUM SUCCINATE 125 MG/2ML
125 INJECTION INTRAMUSCULAR; INTRAVENOUS ONCE
Status: COMPLETED | OUTPATIENT
Start: 2024-12-03 | End: 2024-12-03

## 2024-12-03 RX ORDER — METHYLPREDNISOLONE 4 MG/1
TABLET ORAL
Qty: 1 EACH | Refills: 0 | Status: SHIPPED | OUTPATIENT
Start: 2024-12-03

## 2024-12-03 NOTE — ED INITIAL ASSESSMENT (HPI)
Left sided Chest pain with breathing in since last night. Also complain so bad cough with yellow thick sputum. Pain radiated to shoulder. Denies blood thinners

## 2024-12-03 NOTE — ED PROVIDER NOTES
Patient Seen in: [facility] Emergency Department      History     Chief Complaint   Patient presents with    Chest Pain Angina     Stated Complaint: Chest Pain    Subjective:   HPI    Pt is 72 yo F with h/o fibromyalgia and HTN who p/w left chest wall pain since yesterday. Describes as constant but worse with deep breaths. Feels dizzy. +productive cough. No fevers. Completed antibiotics last week. No vomiting. Pain started when cleaning out closet. No relief with ibuprofen.     Objective:     Past Medical History:    Adenomatous polyp of colon    EGD/colonoscopy at [facility] - H pylori negative gastritis; small adenomatous colon polyp; repeat colonoscopy due in 5 suzanne.    Anxiety state    Asthma (HCC)    Back problem    herniated     Cataract    bilateral-surgery  may and june 2020    Depression    Disorder of thyroid    Esophageal reflux    Essential hypertension, benign    Fibromyalgia    HEADACHES    High cholesterol    Hypercholesterolemia    HYPOTHYROIDISM    Migraines    Muscle weakness    lt leg    MYALGIA AND MYOSITIS NOS    OSTEOARTHRITIS    Osteoarthritis    Osteoarthritis, knee    OSTEOPENIA    Pericardial cyst (HCC)    dx 10 yrs ago    Renal disorder    cyst on left kidney - watching    Rheumatic fever    SEASONAL ALLERGIES    SINUSITIS    Valvular disease    mitral valve prolapse-watching              Past Surgical History:   Procedure Laterality Date    Adenoidectomy      Back surgery  3/12/12    C5-7 ACDF    Back surgery  12/9/13    L5-S1 lami/disk by Dr Fisher    Back surgery  12/29/16    L4-S1 re-expl lami disk    Cataract      bilat may/june 2020    Cholecystectomy      Colonoscopy      Colonoscopy  6/20/11, 10/3/16    repeat in 2021    Colonoscopy N/A 9/10/2021    Procedure: COLONOSCOPY w/ polypectomy and bxs;  Surgeon: Quintin Ann MD;  Location: [facility]    D & c      Drain/inject large joint/bursa  10/9/2013    Procedure: HIP INJECTION;  Surgeon: Yuni Bassett MD;   Location: [facility]    Fluoroscopic guidance needle placement  10/9/2013    Procedure: HIP INJECTION;  Surgeon: Yuni Bassett MD;  Location: [facility]    Hysterectomy      with BSO     Laparoscopic  8/8/2011    Performed by TC DIGGS at [facility]    Other surgical history  11-17-11    [facility] Dr. Ryder    Other surgical history  9-20-12    RBUS -Dr. Ryder    Other surgical history  09/26/2013    RBUS- Dr. Ryder    Other surgical history  6/18/15     RBUS Dr. Jay    Other surgical history  6/30/16    rbus dr ryder     Patient documented not to have experienced any of the following events  10/9/2013    Procedure: HIP INJECTION;  Surgeon: Yuni Bassett MD;  Location: [facility]    Patient withough preoperative order for iv antibiotic surgical site infection prophylaxis.  10/9/2013    Procedure: HIP INJECTION;  Surgeon: Yuni Bassett MD;  Location: [facility]    Tonsillectomy                  Social History     Socioeconomic History    Marital status:    Occupational History    Occupation: homemaker   Tobacco Use    Smoking status: Never    Smokeless tobacco: Never   Vaping Use    Vaping status: Never Used   Substance and Sexual Activity    Alcohol use: Yes     Comment: rarely    Drug use: No    Sexual activity: Yes     Partners: Male     Comment:    Other Topics Concern     Service No    Blood Transfusions No    Caffeine Concern Yes     Comment: coffee 1-2 cup/day occ    Occupational Exposure No    Hobby Hazards No    Sleep Concern No    Stress Concern No    Weight Concern No    Special Diet Yes    Exercise Yes     Comment: walks occ.    Seat Belt Yes    Self-Exams Yes   Social History Narrative     and one son and 3 grandsons     Retired RN                   Physical Exam     ED Triage Vitals [12/03/24 0341]   /82   Pulse 62   Resp 18   Temp 97.3 °F (36.3 °C)   Temp src Temporal   SpO2 97 %   O2  Device None (Room air)       Current Vitals:   Vital Signs  BP: 142/82  Pulse: 62  Resp: 18  Temp: 97.3 °F (36.3 °C)  Temp src: Temporal  MAP (mmHg): (!) 102    Oxygen Therapy  SpO2: 97 %  O2 Device: None (Room air)        Physical Exam  GENERAL: No acute distress, awake and alert  HEENT: EOMI, PERRL  Neck: supple  CV: RRR, no murmurs  Resp: CTAB, no wheezes or retractions. TTP over left chest wall   Ab: soft, nontender, no distension  Extremities: FROM of all extremities  Neuro: CN intact, normal speech, normal gait, 5/5 motor strength in all extremities, no focal deficits  SKIN: warm, dry, no rashes      ED Course     Labs Reviewed   BASIC METABOLIC PANEL (8) - Abnormal; Notable for the following components:       Result Value    BUN 28 (*)     Creatinine 1.25 (*)     BUN/CREA Ratio 22.4 (*)     eGFR-Cr 45 (*)     All other components within normal limits   CBC WITH DIFFERENTIAL WITH PLATELET - Abnormal; Notable for the following components:    RDW-SD 49.7 (*)     All other components within normal limits   D-DIMER - Abnormal; Notable for the following components:    D-Dimer 0.94 (*)     All other components within normal limits   TROPONIN I HIGH SENSITIVITY - Normal   SARS-COV-2/FLU A AND B/RSV BY PCR (GENEXPERT)     EKG    Rate, intervals and axes as noted on EKG Report.  Rate: 66  Rhythm: Sinus Rhythm  Reading: no ARTHUR, normal EKG         [facility]        Medical Decision Making  Exam c/w costochondritis  Pt given morphine for pain  Labs reassuring    On reassessment pain improved but still present. Declines further pain meds. Will try steroids and steroid rx for home for fibromyalgia flareup.     Amount and/or Complexity of Data Reviewed  External Data Reviewed: labs and radiology.     Details: Labs 2024 reviewed  CT coronaries 2024 reviewed  Stress test 2020 reviewed  Labs: ordered.  Radiology: ordered.     Details:   XR portable chest 1 view(s)      IMPRESSION:    Comparison: 12/31/2016    No acute intrathoracic  process.  Tortuous possibly ectatic descending thoracic aorta.  Fusion instrumentation in the cervical spine.    Report sent at 05:51 AM ET    Kevin Song MD    CTA chest      IMPRESSION:    No pulmonary embolism.  Normal pulmonary trunk.  Mild calcification in the thoracic aorta.  Heart size normal.  Normal esophagus.  No consolidation or pleural effusion.  No acute fracture or malalignment.    Report sent at 05:54 AM ET    Kevin Song MD      Risk  Prescription drug management.  Parenteral controlled substances.        Disposition and Plan     Clinical Impression:  1. Chest wall pain    2. Fibromyalgia         Disposition:  Discharge  12/3/2024  4:58 am    Follow-up:  Therese Nelson MD  [address] [facility]  [address] [place] [address]  [phone]    Follow up      We recommend that you schedule follow up care with a primary care provider within the next three months to obtain basic health screening including reassessment of your blood pressure.      Medications Prescribed:  Current Discharge Medication List        START taking these medications    Details   methylPREDNISolone (MEDROL) 4 MG Oral Tablet Therapy Pack Dosepack: take as directed  Qty: 1 each, Refills: 0                 Supplementary Documentation:

## 2025-07-02 ENCOUNTER — APPOINTMENT (OUTPATIENT)
Dept: MRI IMAGING | Facility: HOSPITAL | Age: 75
End: 2025-07-02
Attending: EMERGENCY MEDICINE
Payer: MEDICARE

## 2025-07-02 ENCOUNTER — HOSPITAL ENCOUNTER (EMERGENCY)
Facility: HOSPITAL | Age: 75
Discharge: HOME | End: 2025-07-02
Attending: EMERGENCY MEDICINE
Payer: MEDICARE

## 2025-07-02 VITALS
RESPIRATION RATE: 12 BRPM | TEMPERATURE: 98 F | HEART RATE: 70 BPM | DIASTOLIC BLOOD PRESSURE: 75 MMHG | OXYGEN SATURATION: 99 % | SYSTOLIC BLOOD PRESSURE: 147 MMHG | WEIGHT: 173 LBS | BODY MASS INDEX: 30 KG/M2

## 2025-07-02 DIAGNOSIS — G43.501 PERSISTENT MIGRAINE AURA WITHOUT CEREBRAL INFARCTION AND WITH STATUS MIGRAINOSUS, NOT INTRACTABLE: Primary | ICD-10-CM

## 2025-07-02 LAB
ANION GAP SERPL CALC-SCNC: 7 MMOL/L (ref 0–18)
BASOPHILS # BLD AUTO: 0.07 X10(3) UL (ref 0–0.2)
BASOPHILS NFR BLD AUTO: 1.1 %
BUN BLD-MCNC: 16 MG/DL (ref 9–23)
BUN/CREAT SERPL: 16.7 (ref 10–20)
CALCIUM BLD-MCNC: 9.3 MG/DL (ref 8.7–10.4)
CHLORIDE SERPL-SCNC: 110 MMOL/L (ref 98–112)
CO2 SERPL-SCNC: 25 MMOL/L (ref 21–32)
CREAT BLD-MCNC: 0.96 MG/DL (ref 0.55–1.02)
DEPRECATED RDW RBC AUTO: 52 FL (ref 35.1–46.3)
EGFRCR SERPLBLD CKD-EPI 2021: 61 ML/MIN/1.73M2 (ref 60–?)
EOSINOPHIL # BLD AUTO: 0.16 X10(3) UL (ref 0–0.7)
EOSINOPHIL NFR BLD AUTO: 2.5 %
ERYTHROCYTE [DISTWIDTH] IN BLOOD BY AUTOMATED COUNT: 15.6 % (ref 11–15)
GLUCOSE BLD-MCNC: 93 MG/DL (ref 70–99)
GLUCOSE BLDC GLUCOMTR-MCNC: 84 MG/DL (ref 70–99)
HCT VFR BLD AUTO: 39.1 % (ref 35–48)
HGB BLD-MCNC: 12.5 G/DL (ref 12–16)
IMM GRANULOCYTES # BLD AUTO: 0.02 X10(3) UL (ref 0–1)
IMM GRANULOCYTES NFR BLD: 0.3 %
LYMPHOCYTES # BLD AUTO: 1.72 X10(3) UL (ref 1–4)
LYMPHOCYTES NFR BLD AUTO: 27.1 %
MAGNESIUM SERPL-MCNC: 2 MG/DL (ref 1.6–2.6)
MCH RBC QN AUTO: 29.6 PG (ref 26–34)
MCHC RBC AUTO-ENTMCNC: 32 G/DL (ref 31–37)
MCV RBC AUTO: 92.7 FL (ref 80–100)
MONOCYTES # BLD AUTO: 0.44 X10(3) UL (ref 0.1–1)
MONOCYTES NFR BLD AUTO: 6.9 %
NEUTROPHILS # BLD AUTO: 3.94 X10 (3) UL (ref 1.5–7.7)
NEUTROPHILS # BLD AUTO: 3.94 X10(3) UL (ref 1.5–7.7)
NEUTROPHILS NFR BLD AUTO: 62.1 %
OSMOLALITY SERPL CALC.SUM OF ELEC: 295 MOSM/KG (ref 275–295)
PLATELET # BLD AUTO: 288 10(3)UL (ref 150–450)
POTASSIUM SERPL-SCNC: 4.1 MMOL/L (ref 3.5–5.1)
RBC # BLD AUTO: 4.22 X10(6)UL (ref 3.8–5.3)
SODIUM SERPL-SCNC: 142 MMOL/L (ref 136–145)
WBC # BLD AUTO: 6.4 X10(3) UL (ref 4–11)

## 2025-07-02 PROCEDURE — 96374 THER/PROPH/DIAG INJ IV PUSH: CPT

## 2025-07-02 PROCEDURE — 83735 ASSAY OF MAGNESIUM: CPT | Performed by: EMERGENCY MEDICINE

## 2025-07-02 PROCEDURE — 85025 COMPLETE CBC W/AUTO DIFF WBC: CPT | Performed by: EMERGENCY MEDICINE

## 2025-07-02 PROCEDURE — 99285 EMERGENCY DEPT VISIT HI MDM: CPT

## 2025-07-02 PROCEDURE — 82962 GLUCOSE BLOOD TEST: CPT

## 2025-07-02 PROCEDURE — 96361 HYDRATE IV INFUSION ADD-ON: CPT

## 2025-07-02 PROCEDURE — 70551 MRI BRAIN STEM W/O DYE: CPT | Performed by: RADIOLOGY

## 2025-07-02 PROCEDURE — 96375 TX/PRO/DX INJ NEW DRUG ADDON: CPT

## 2025-07-02 PROCEDURE — 80048 BASIC METABOLIC PNL TOTAL CA: CPT | Performed by: EMERGENCY MEDICINE

## 2025-07-02 RX ORDER — KETOROLAC TROMETHAMINE 15 MG/ML
15 INJECTION, SOLUTION INTRAMUSCULAR; INTRAVENOUS ONCE
Status: COMPLETED | OUTPATIENT
Start: 2025-07-02 | End: 2025-07-02

## 2025-07-02 RX ORDER — METOCLOPRAMIDE HYDROCHLORIDE 5 MG/ML
5 INJECTION INTRAMUSCULAR; INTRAVENOUS ONCE
Status: COMPLETED | OUTPATIENT
Start: 2025-07-02 | End: 2025-07-02

## 2025-07-02 RX ORDER — HYDRALAZINE HYDROCHLORIDE 20 MG/ML
10 INJECTION INTRAMUSCULAR; INTRAVENOUS ONCE
Status: COMPLETED | OUTPATIENT
Start: 2025-07-02 | End: 2025-07-02

## 2025-07-02 RX ORDER — DIPHENHYDRAMINE HYDROCHLORIDE 50 MG/ML
12.5 INJECTION, SOLUTION INTRAMUSCULAR; INTRAVENOUS ONCE
Status: COMPLETED | OUTPATIENT
Start: 2025-07-02 | End: 2025-07-02

## 2025-07-02 RX ORDER — DEXAMETHASONE SODIUM PHOSPHATE 10 MG/ML
4 INJECTION, SOLUTION INTRAMUSCULAR; INTRAVENOUS ONCE
Status: COMPLETED | OUTPATIENT
Start: 2025-07-02 | End: 2025-07-02

## 2025-07-02 RX ORDER — BUTALBITAL, ACETAMINOPHEN, CAFFEINE AND CODEINE PHOSPHATE 50; 325; 40; 30 MG/1; MG/1; MG/1; MG/1
1 CAPSULE ORAL EVERY 6 HOURS PRN
Qty: 10 CAPSULE | Refills: 0 | Status: SHIPPED | OUTPATIENT
Start: 2025-07-02

## 2025-07-02 NOTE — ED INITIAL ASSESSMENT (HPI)
Patient reports waking up this am having a difficult time expressing thoughts and getting words out in addition to room-spinning type dizziness and 6/10 headache. A/o x 4 with no neuro deficits noted in triage. Not on a blood thinner

## 2025-07-02 NOTE — ED QUICK NOTES
reported that patient has been having these symptoms since being recently dx'd with migraines to which the patient agrees

## 2025-07-02 NOTE — ED PROVIDER NOTES
Patient Seen in: [facility] Emergency Department        History  Chief Complaint   Patient presents with    Dizziness     Stated Complaint: Headache, dizziness, difficulty speaking, sicne 1100    Subjective:   HPI            75 y/o female here w/ her  for eval of some described expressive aphasia today.  Mild frontal HA.  Symptoms a little better now.  Back in Feb had similar but more severe.  She said stroke was ruled out and they presumed it was a complex migraine.  Her symptoms of gotten better after the neurologist Dr. Ann changed some of her medications and regimen.  She denies any trauma.  No new vision changes.  No falls.  She feels off balance though.      Objective:     Past Medical History:    Adenomatous polyp of colon    EGD/colonoscopy at [facility] - H pylori negative gastritis; small adenomatous colon polyp; repeat colonoscopy due in 5 suzanne.    Anxiety state    Asthma (HCC)    Back problem    herniated     Cataract    bilateral-surgery  may and june 2020    Depression    Disorder of thyroid    Esophageal reflux    Essential hypertension, benign    Fibromyalgia    HEADACHES    High cholesterol    Hypercholesterolemia    HYPOTHYROIDISM    Migraines    Muscle weakness    lt leg    MYALGIA AND MYOSITIS NOS    OSTEOARTHRITIS    Osteoarthritis    Osteoarthritis, knee    OSTEOPENIA    Pericardial cyst (HCC)    dx 10 yrs ago    Renal disorder    cyst on left kidney - watching    Rheumatic fever    SEASONAL ALLERGIES    SINUSITIS    Valvular disease    mitral valve prolapse-watching              Past Surgical History:   Procedure Laterality Date    Adenoidectomy      Back surgery  3/12/12    C5-7 ACDF    Back surgery  12/9/13    L5-S1 lami/disk by Dr Fisher    Back surgery  12/29/16    L4-S1 re-expl lami disk    Cataract      bilat may/june 2020    Cholecystectomy      Colonoscopy      Colonoscopy  6/20/11, 10/3/16    repeat in 2021    Colonoscopy N/A 9/10/2021    Procedure: COLONOSCOPY w/  polypectomy and bxs;  Surgeon: Quintin Ann MD;  Location: [facility]    D & c      Drain/inject large joint/bursa  10/9/2013    Procedure: HIP INJECTION;  Surgeon: Yuni Bassett MD;  Location: [facility]    Fluoroscopic guidance needle placement  10/9/2013    Procedure: HIP INJECTION;  Surgeon: Yuni Bassett MD;  Location: [facility]    Hysterectomy      with BSO     Laparoscopic  8/8/2011    Performed by [facility] at [facility]    Other surgical history  11-17-11    [facility] Dr. Ryder    Other surgical history  9-20-12    [facility] -Dr. Ryder    Other surgical history  09/26/2013    [facility]- Dr. Ryder    Other surgical history  6/18/15     [facility] Dr. Jay    Other surgical history  6/30/16    [facility] dr ryder     Patient documented not to have experienced any of the following events  10/9/2013    Procedure: HIP INJECTION;  Surgeon: Yuni Bassett MD;  Location: [facility]    Patient withough preoperative order for iv antibiotic surgical site infection prophylaxis.  10/9/2013    Procedure: HIP INJECTION;  Surgeon: Yuni Bassett MD;  Location: [facility]    Tonsillectomy                  Social History     Socioeconomic History    Marital status:    Occupational History    Occupation: homemaker   Tobacco Use    Smoking status: Never    Smokeless tobacco: Never   Vaping Use    Vaping status: Never Used   Substance and Sexual Activity    Alcohol use: Yes     Comment: rarely    Drug use: No    Sexual activity: Yes     Partners: Male     Comment:    Other Topics Concern     Service No    Blood Transfusions No    Caffeine Concern Yes     Comment: coffee 1-2 cup/day occ    Occupational Exposure No    Hobby Hazards No    Sleep Concern No    Stress Concern No    Weight Concern No    Special Diet Yes    Exercise Yes     Comment: walks occ.    Seat Belt Yes    Self-Exams Yes   Social History Narrative     and  one son and 3 grandsons     Retired RN                                 Physical Exam    ED Triage Vitals   BP 07/02/25 1438 (!) 157/92   Pulse 07/02/25 1434 70   Resp 07/02/25 1434 20   Temp 07/02/25 1438 97.8 °F (36.6 °C)   Temp src 07/02/25 1438 Temporal   SpO2 07/02/25 1434 98 %   O2 Device 07/02/25 1434 None (Room air)       Current Vitals:   Vital Signs  BP: 147/75  Pulse: 70  Resp: 12  Temp: 97.8 °F (36.6 °C)  Temp src: Temporal  MAP (mmHg): 97    Oxygen Therapy  SpO2: 99 %  O2 Device: None (Room air)            Physical Exam  Constitutional: Oriented to person, place, and time.  Appears well-developed. No distress.   Head: Normocephalic and atraumatic.   Eyes: Conjunctivae are normal. Pupils are equal, round, and reactive to light.   Neck: Normal range of motion. Neck supple.   Cardiovascular: Normal rate, regular rhythm and intact and equal distal pulses.    Pulmonary/Chest: Effort normal. No respiratory distress.   Abdominal: Soft. There is no tenderness. There is no guarding.   Musculoskeletal: Normal range of motion. No edema or tenderness.   Neurological: Alert and oriented to person, place, and time.  No gross deficits or facial asymmetry or dysarthria although patient did have a brief episode where she had trouble finding her words momentarily.  Skin: Skin is warm and dry.   Psychiatric: Normal mood and affect.  Behavior is normal.   Nursing note and vitals reviewed.    Differential diagnosis includes complex migraine, stroke, intracranial mass or bleeding less likely.          ED Course  Labs Reviewed   CBC WITH DIFFERENTIAL WITH PLATELET - Abnormal; Notable for the following components:       Result Value    RDW-SD 52.0 (*)     RDW 15.6 (*)     All other components within normal limits   BASIC METABOLIC PANEL (8) - Normal   MAGNESIUM - Normal   POCT GLUCOSE - Normal          MRI BRAIN WO ACUTE (3) SEQUENCE (CPT=70551)  Result Date: 7/2/2025  PROCEDURE: MRI BRAIN WO ACUTE (3) SEQUENCE (CPT=70551)  INDICATIONS:  Headache, dizziness, difficulty speaking, sicne 1100 history of hypertension. COMPARISON: There are no comparisons for this exam. TECHNIQUE: Fast brain stroke protocol MRI. A variety of imaging planes and parameters were utilized for visualization of suspected pathology. Images were performed without contrast. FINDINGS: CEREBRUM: Pathologic signal in the periventricular and subcortical white matter bilaterally. Chronic lacunar infarct at the junction between the anterior right internal capsule and basal ganglia, and left basal ganglia. No edema, hemorrhage, mass or acute infarct. CEREBELLUM: No edema, hemorrhage, mass or acute infarct. BRAINSTEM: Patchy pathologic signal in the myriam. No edema, hemorrhage, mass or acute infarct. CSF SPACES: Age-related prominence of ventricles and sulci. No hydrocephalus, subarachnoid hemorrhage, or mass. SKULL: No mass or other significant visible lesion. SINUSES: Limited views demonstrate no significant mucosal thickening or fluid. ORBITS: Limited views are unremarkable. OTHER: No restricted diffusion. Flow is present in the anterior and posterior circulation vessels. Ligamentous soft tissue thickening along the dorsum of the dens mildly narrowing the craniovertebral junction.     CONCLUSION: 1.   No acute infarct or hemorrhage. 2.  Moderate to advanced changes of chronic small vessel disease in both cerebral hemispheres. 3.  Moderate changes of chronic small vessel disease in the myriam. Electronically Verified and Signed by Attending Radiologist: Grabiel Pavon MD 7/2/2025 10:16 PM Workstation: [employer]                      [facility]             Medical Decision Making  Patient feeling improved before discharge.  Extensive delay getting patient to MRI.  This was explained to her and the .  She can go and follow-up with her neurologist.  I will give her something for recurrent headache.  She looks clinically well.  Blood pressures down.  She will consider  doubling her candesartan if her blood pressure remains elevated over 160 systolic.  She does have an appointment with her doctor later next week.  She can do Tylenol for pain as well.  No acute indication for admission to hospital at this time no    Problems Addressed:  Persistent migraine aura without cerebral infarction and with status migrainosus, not intractable: chronic illness or injury with severe exacerbation, progression, or side effects of treatment    Amount and/or Complexity of Data Reviewed  Labs: ordered. Decision-making details documented in ED Course.  Radiology: ordered and independent interpretation performed. Decision-making details documented in ED Course.     Details: Migraine    Risk  OTC drugs.  Prescription drug management.  Decision regarding hospitalization.        Disposition and Plan     Clinical Impression:  1. Persistent migraine aura without cerebral infarction and with status migrainosus, not intractable         Disposition:  Discharge  7/2/2025 10:35 pm    Follow-up:  Therese Nelson MD  [address]  [address] [place] [address]  [phone]    Follow up      We recommend that you schedule follow up care with a primary care provider within the next three months to obtain basic health screening including reassessment of your blood pressure.      Medications Prescribed:  Current Discharge Medication List        START taking these medications    Details   Butalbital-APAP-Caff-Cod -20-30 MG Oral Cap Take 1 capsule by mouth every 6 (six) hours as needed for Migraine.  Qty: 10 capsule, Refills: 0    Associated Diagnoses: Persistent migraine aura without cerebral infarction and with status migrainosus, not intractable                   Supplementary Documentation:

## 2025-07-03 NOTE — ED QUICK NOTES
Signout given to Noah DELATORRE. Rounded on pt/spouse, no concerns at this time, updated on MRI timing.

## (undated) DEVICE — UPPER EXTREMITY: Brand: MEDLINE INDUSTRIES, INC.

## (undated) DEVICE — SUTURE ETHIBOND EXCEL 3-0 RB

## (undated) DEVICE — REM POLYHESIVE ADULT PATIENT RETURN ELECTRODE: Brand: VALLEYLAB

## (undated) DEVICE — 3.2MM ROUND FAST CUTTING BUR

## (undated) DEVICE — SUTURE ETHILON 4-0 1667G

## (undated) DEVICE — NEEDLE HPO 18GA 1.5IN ECLPS

## (undated) DEVICE — CHLORAPREP 26ML APPLICATOR

## (undated) DEVICE — GAMMEX® PI HYBRID SIZE 9, STERILE POWDER-FREE SURGICAL GLOVE, POLYISOPRENE AND NEOPRENE BLEND: Brand: GAMMEX

## (undated) DEVICE — ENCORE® LATEX ACCLAIM SIZE 8.5, STERILE LATEX POWDER-FREE SURGICAL GLOVE: Brand: ENCORE

## (undated) DEVICE — INTENDED FOR TISSUE SEPARATION, AND OTHER PROCEDURES THAT REQUIRE A SHARP SURGICAL BLADE TO PUNCTURE OR CUT.: Brand: BARD-PARKER ® STAINLESS STEEL BLADES

## (undated) DEVICE — ELECTRODE ESURG 2.75IN EZ CLN

## (undated) DEVICE — COTTON UNDERCAST PADDING,REGULAR FINISH: Brand: WEBRIL

## (undated) DEVICE — SUTURE PROLENE 4-0 RB-1

## (undated) DEVICE — SOL  .9 1000ML BTL

## (undated) DEVICE — 1010 S-DRAPE TOWEL DRAPE 10/BX: Brand: STERI-DRAPE™

## (undated) DEVICE — SUTURE PDS II 3-0 RB-1

## (undated) DEVICE — SPLINT PRECUT ORTHOGLASS 4X15

## (undated) DEVICE — STERILE TETRA-FLEX CF, ELASTIC BANDAGEE, 3" X 5.5YD: Brand: TETRA-FLEX™CF

## (undated) DEVICE — TOURNIQUET CUFF 18 STR

## (undated) DEVICE — C-ARM: Brand: UNBRANDED

## (undated) DEVICE — COVER SGL STRL LGHT HNDL BLU

## (undated) DEVICE — STERILE TETRA-FLEX CF, ELASTIC BANDAGE, 2" X 5.5YD: Brand: TETRA-FLEX™CF

## (undated) DEVICE — NON-ADHERENT PADS PREPACK: Brand: TELFA

## (undated) DEVICE — HEWSON SUTURE RETRIEVER: Brand: HEWSON SUTURE RETRIEVER

## (undated) DEVICE — ZIMMER® STERILE DISPOSABLE TOURNIQUET CUFF WITH PLC, DUAL PORT, SINGLE BLADDER, 18 IN. (46 CM)

## (undated) DEVICE — MEDI-VAC NON-CONDUCTIVE SUCTION TUBING: Brand: CARDINAL HEALTH

## (undated) DEVICE — X-RAY DETECTABLE SPONGES,16 PLY: Brand: VISTEC

## (undated) DEVICE — CASED DISP BIPOLAR CORD

## (undated) DEVICE — SPECIALTY ARM SLING: Brand: DEROYAL

## (undated) DEVICE — ACCUVAC SMOKE ATTACHMENT